# Patient Record
Sex: FEMALE | Race: BLACK OR AFRICAN AMERICAN | Employment: FULL TIME | URBAN - METROPOLITAN AREA
[De-identification: names, ages, dates, MRNs, and addresses within clinical notes are randomized per-mention and may not be internally consistent; named-entity substitution may affect disease eponyms.]

---

## 2022-10-29 ENCOUNTER — HOSPITAL ENCOUNTER (EMERGENCY)
Age: 37
Discharge: HOME | End: 2022-10-29
Attending: EMERGENCY MEDICINE

## 2022-10-29 ENCOUNTER — APPOINTMENT (OUTPATIENT)
Dept: ULTRASOUND IMAGING | Age: 37
End: 2022-10-29

## 2022-10-29 VITALS
HEIGHT: 63 IN | TEMPERATURE: 98.4 F | OXYGEN SATURATION: 99 % | WEIGHT: 214.6 LBS | SYSTOLIC BLOOD PRESSURE: 135 MMHG | DIASTOLIC BLOOD PRESSURE: 86 MMHG | BODY MASS INDEX: 38.02 KG/M2 | RESPIRATION RATE: 15 BRPM | HEART RATE: 88 BPM

## 2022-10-29 DIAGNOSIS — Z34.90 PREGNANCY, UNSPECIFIED GESTATIONAL AGE: Primary | ICD-10-CM

## 2022-10-29 DIAGNOSIS — R10.2 SUPRAPUBIC ABDOMINAL PAIN: ICD-10-CM

## 2022-10-29 DIAGNOSIS — R10.11 RIGHT UPPER QUADRANT ABDOMINAL PAIN: ICD-10-CM

## 2022-10-29 LAB
ALBUMIN SERPL-MCNC: 3.9 GM/DL (ref 3.4–5)
ALP BLD-CCNC: 56 IU/L (ref 40–129)
ALT SERPL-CCNC: 17 U/L (ref 10–40)
ANION GAP SERPL CALCULATED.3IONS-SCNC: 9 MMOL/L (ref 4–16)
AST SERPL-CCNC: 15 IU/L (ref 15–37)
BASOPHILS ABSOLUTE: 0 K/CU MM
BASOPHILS RELATIVE PERCENT: 0.4 % (ref 0–1)
BILIRUB SERPL-MCNC: 0.2 MG/DL (ref 0–1)
BILIRUBIN DIRECT: 0.2 MG/DL (ref 0–0.3)
BILIRUBIN URINE: NEGATIVE MG/DL
BILIRUBIN, INDIRECT: 0 MG/DL (ref 0–0.7)
BLOOD, URINE: NEGATIVE
BUN BLDV-MCNC: 11 MG/DL (ref 6–23)
CALCIUM SERPL-MCNC: 8.9 MG/DL (ref 8.3–10.6)
CHLORIDE BLD-SCNC: 104 MMOL/L (ref 99–110)
CLARITY: CLEAR
CO2: 22 MMOL/L (ref 21–32)
COLOR: YELLOW
CREAT SERPL-MCNC: 0.6 MG/DL (ref 0.6–1.1)
DIFFERENTIAL TYPE: ABNORMAL
EOSINOPHILS ABSOLUTE: 0.1 K/CU MM
EOSINOPHILS RELATIVE PERCENT: 1.3 % (ref 0–3)
GFR SERPL CREATININE-BSD FRML MDRD: >60 ML/MIN/1.73M2
GLUCOSE BLD-MCNC: 111 MG/DL (ref 70–99)
GLUCOSE, URINE: NEGATIVE MG/DL
GONADOTROPIN, CHORIONIC (HCG) QUANT: NORMAL UIU/ML
HCT VFR BLD CALC: 35.2 % (ref 37–47)
HEMOGLOBIN: 11.7 GM/DL (ref 12.5–16)
IMMATURE NEUTROPHIL %: 0.4 % (ref 0–0.43)
INTERPRETATION: ABNORMAL
KETONES, URINE: ABNORMAL MG/DL
LEUKOCYTE ESTERASE, URINE: NEGATIVE
LIPASE: 31 IU/L (ref 13–60)
LYMPHOCYTES ABSOLUTE: 2.4 K/CU MM
LYMPHOCYTES RELATIVE PERCENT: 31.4 % (ref 24–44)
MCH RBC QN AUTO: 29.3 PG (ref 27–31)
MCHC RBC AUTO-ENTMCNC: 33.2 % (ref 32–36)
MCV RBC AUTO: 88.2 FL (ref 78–100)
MONOCYTES ABSOLUTE: 0.7 K/CU MM
MONOCYTES RELATIVE PERCENT: 8.6 % (ref 0–4)
NITRITE URINE, QUANTITATIVE: NEGATIVE
PDW BLD-RTO: 12.2 % (ref 11.7–14.9)
PH, URINE: 7 (ref 5–8)
PLATELET # BLD: 396 K/CU MM (ref 140–440)
PMV BLD AUTO: 9 FL (ref 7.5–11.1)
POTASSIUM SERPL-SCNC: 4 MMOL/L (ref 3.5–5.1)
PREGNANCY, URINE: POSITIVE
PROTEIN UA: NEGATIVE MG/DL
RBC # BLD: 3.99 M/CU MM (ref 4.2–5.4)
SEGMENTED NEUTROPHILS ABSOLUTE COUNT: 4.5 K/CU MM
SEGMENTED NEUTROPHILS RELATIVE PERCENT: 57.9 % (ref 36–66)
SODIUM BLD-SCNC: 135 MMOL/L (ref 135–145)
SPECIFIC GRAVITY UA: 1.02 (ref 1–1.03)
SPECIFIC GRAVITY, URINE: 1.02 (ref 1–1.03)
TOTAL IMMATURE NEUTOROPHIL: 0.03 K/CU MM
TOTAL PROTEIN: 7.1 GM/DL (ref 6.4–8.2)
UROBILINOGEN, URINE: 0.2 MG/DL (ref 0.2–1)
WBC # BLD: 7.7 K/CU MM (ref 4–10.5)

## 2022-10-29 PROCEDURE — 99284 EMERGENCY DEPT VISIT MOD MDM: CPT

## 2022-10-29 PROCEDURE — 80053 COMPREHEN METABOLIC PANEL: CPT

## 2022-10-29 PROCEDURE — 6360000002 HC RX W HCPCS: Performed by: EMERGENCY MEDICINE

## 2022-10-29 PROCEDURE — 93975 VASCULAR STUDY: CPT

## 2022-10-29 PROCEDURE — 85025 COMPLETE CBC W/AUTO DIFF WBC: CPT

## 2022-10-29 PROCEDURE — 76817 TRANSVAGINAL US OBSTETRIC: CPT

## 2022-10-29 PROCEDURE — 81003 URINALYSIS AUTO W/O SCOPE: CPT

## 2022-10-29 PROCEDURE — 6370000000 HC RX 637 (ALT 250 FOR IP): Performed by: EMERGENCY MEDICINE

## 2022-10-29 PROCEDURE — 96374 THER/PROPH/DIAG INJ IV PUSH: CPT

## 2022-10-29 PROCEDURE — 83690 ASSAY OF LIPASE: CPT

## 2022-10-29 PROCEDURE — 82248 BILIRUBIN DIRECT: CPT

## 2022-10-29 PROCEDURE — 81025 URINE PREGNANCY TEST: CPT

## 2022-10-29 PROCEDURE — 84702 CHORIONIC GONADOTROPIN TEST: CPT

## 2022-10-29 RX ORDER — PNV NO.95/FERROUS FUM/FOLIC AC 28MG-0.8MG
1 TABLET ORAL DAILY
Qty: 30 TABLET | Refills: 0 | Status: SHIPPED | OUTPATIENT
Start: 2022-10-29

## 2022-10-29 RX ORDER — ACETAMINOPHEN 500 MG
1000 TABLET ORAL ONCE
Status: COMPLETED | OUTPATIENT
Start: 2022-10-29 | End: 2022-10-29

## 2022-10-29 RX ORDER — ONDANSETRON 2 MG/ML
4 INJECTION INTRAMUSCULAR; INTRAVENOUS EVERY 6 HOURS PRN
Status: DISCONTINUED | OUTPATIENT
Start: 2022-10-29 | End: 2022-10-30 | Stop reason: HOSPADM

## 2022-10-29 RX ADMIN — ONDANSETRON 4 MG: 2 INJECTION INTRAMUSCULAR; INTRAVENOUS at 18:25

## 2022-10-29 RX ADMIN — ACETAMINOPHEN 1000 MG: 500 TABLET ORAL at 18:26

## 2022-10-29 ASSESSMENT — PAIN - FUNCTIONAL ASSESSMENT
PAIN_FUNCTIONAL_ASSESSMENT: 0-10
PAIN_FUNCTIONAL_ASSESSMENT: NONE - DENIES PAIN

## 2022-10-29 ASSESSMENT — PAIN DESCRIPTION - LOCATION: LOCATION: ABDOMEN

## 2022-10-29 ASSESSMENT — PAIN DESCRIPTION - FREQUENCY: FREQUENCY: INTERMITTENT

## 2022-10-29 ASSESSMENT — PAIN DESCRIPTION - PAIN TYPE: TYPE: ACUTE PAIN

## 2022-10-29 ASSESSMENT — PAIN SCALES - GENERAL: PAINLEVEL_OUTOF10: 5

## 2022-10-29 ASSESSMENT — PAIN DESCRIPTION - DESCRIPTORS: DESCRIPTORS: ACHING

## 2022-10-29 NOTE — ED PROVIDER NOTES
Emergency Department Encounter    Patient: Rama Álvarez  MRN: [id number]  : 1985  Date of Evaluation: 2022  ED Provider:  Michelle Casas MD    Triage Chief Complaint:   Abdominal Pain    [place]:  Rama Álvarez is a 40 y.o. female that presents complaining of intermittent 5 out of 10 crampy abdominal discomfort located in the suprapubic region with associated nausea. No vomiting. No urinary frequency, urgency, dysuria, hematuria. No diarrhea, constipation, hematochezia or melena. Patient denies any fever or cough. Patient denies chest pain. She does report some occasional \"heavy breathing\" with activity which is been going on for the past 6 days. Abdominal discomfort has been present for the past 5 to 6 days. No lower extremity edema. Patient currently denies any abdominal pain. Patient is unsure if she may be pregnant. Patient denies any abnormal vaginal discharge or bleeding. No known alleviating or exacerbating factors. No radiation. No flank pain. ROS - see HPI, below listed is current ROS at time of my eval:  General:  No fevers, no chills, no weakness  Eyes:  no discharge  ENT:  No sore throat, no nasal congestion  Cardiovascular:  No chest pain, no palpitations  Respiratory:  no cough, no wheezing  Gastrointestinal:  + Suprapubic abdominal pain, + nausea, no vomiting, no diarrhea  Musculoskeletal:  No muscle pain, no joint pain  Skin:  No rash, no pruritis  Neurologic:  no headache  Genitourinary:  No dysuria, no hematuria  Endocrine:  No unexpected weight gain, no unexpected weight loss  Extremities:  no edema, no pain    History reviewed. No pertinent past medical history. History reviewed. No pertinent surgical history. History reviewed. No pertinent family history.   Social History     Socioeconomic History    Marital status: Single     Spouse name: Not on file    Number of children: Not on file    Years of education: Not on file    Highest education level: Not on file Occupational History    Not on file   Tobacco Use    Smoking status: Never    Smokeless tobacco: Never   Vaping Use    Vaping Use: Never used   Substance and Sexual Activity    Alcohol use: Never    Drug use: Never    Sexual activity: Not on file   Other Topics Concern    Not on file   Social History Narrative    Not on file     Social Determinants of Health     Financial Resource Strain: Not on file   Food Insecurity: Not on file   Transportation Needs: Not on file   Physical Activity: Not on file   Stress: Not on file   Social Connections: Not on file   Intimate Partner Violence: Not on file   Housing Stability: Not on file     No current facility-administered medications for this encounter. Current Outpatient Medications   Medication Sig Dispense Refill    Prenatal Vit-Fe Fumarate-FA (PRENATAL VITAMIN) 27-0.8 MG TABS Take 1 tablet by mouth daily 30 tablet 0     Allergies   Allergen Reactions    Penicillins        Nursing Notes Reviewed    Physical Exam:  Triage VS:    ED Triage Vitals [10/29/22 1742]   Enc Vitals Group      /86      Heart Rate 88      Resp 15      Temp 98.4 °F (36.9 °C)      Temp Source Oral      SpO2 99 %      Weight 214 lb 9.6 oz (97.3 kg)      Height 5' 2.6\" (1.59 m)      Head Circumference       Peak Flow       Pain Score       Pain Loc       Pain Edu? Excl. in [address]? My pulse ox interpretation is - normal    General appearance:  No acute distress. Skin:  Warm. Dry. No rash. Neck:  Trachea midline. Heart:  Regular rate and rhythm, normal S1 & S2.    Perfusion:  intact  Respiratory:  Lungs clear to auscultation bilaterally. Respirations nonlabored.      Abdominal:  + suprapubic and to a lesser degree right lower quadrant abdominal tenderness to palpation, no rebound guarding or rigidity, neg Meng's sign, neg McBurney's point tenderness to palpation, normal bowel sounds, no masses, no organomegaly, no pulsatile masses  Back:  No CVA TTP  Extremity:    normal ROM Neurological:  Alert and oriented times 3.       I have reviewed and interpreted all of the currently available lab results from this visit (if applicable):  Results for orders placed or performed during the hospital encounter of 10/29/22   CBC with Auto Differential   Result Value Ref Range    WBC 7.7 4.0 - 10.5 K/CU MM    RBC 3.99 (L) 4.2 - 5.4 M/CU MM    Hemoglobin 11.7 (L) 12.5 - 16.0 GM/DL    Hematocrit 35.2 (L) 37 - 47 %    MCV 88.2 78 - 100 FL    MCH 29.3 27 - 31 PG    MCHC 33.2 32.0 - 36.0 %    RDW 12.2 11.7 - 14.9 %    Platelets 351 132 - 537 K/CU MM    MPV 9.0 7.5 - 11.1 FL    Differential Type AUTOMATED DIFFERENTIAL     Segs Relative 57.9 36 - 66 %    Lymphocytes % 31.4 24 - 44 %    Monocytes % 8.6 (H) 0 - 4 %    Eosinophils % 1.3 0 - 3 %    Basophils % 0.4 0 - 1 %    Segs Absolute 4.5 K/CU MM    Lymphocytes Absolute 2.4 K/CU MM    Monocytes Absolute 0.7 K/CU MM    Eosinophils Absolute 0.1 K/CU MM    Basophils Absolute 0.0 K/CU MM    Immature Neutrophil % 0.4 0 - 0.43 %    Total Immature Neutrophil 0.03 K/CU MM   Basic Metabolic Panel   Result Value Ref Range    Sodium 135 135 - 145 MMOL/L    Potassium 4.0 3.5 - 5.1 MMOL/L    Chloride 104 99 - 110 mMol/L    CO2 22 21 - 32 MMOL/L    Anion Gap 9 4 - 16    BUN 11 6 - 23 MG/DL    Creatinine 0.6 0.6 - 1.1 MG/DL    Est, Glom Filt Rate >60 >60 mL/min/1.73m2    Glucose 111 (H) 70 - 99 MG/DL    Calcium 8.9 8.3 - 10.6 MG/DL   Hepatic Function Panel   Result Value Ref Range    Albumin 3.9 3.4 - 5.0 GM/DL    Total Bilirubin 0.2 0.0 - 1.0 MG/DL    Bilirubin, Direct 0.2 0.0 - 0.3 MG/DL    Bilirubin, Indirect 0.0 0 - 0.7 MG/DL    Alkaline Phosphatase 56 40 - 129 IU/L    AST 15 15 - 37 IU/L    ALT 17 10 - 40 U/L    Total Protein 7.1 6.4 - 8.2 GM/DL   Lipase   Result Value Ref Range    Lipase 31 13 - 60 IU/L   Urinalysis   Result Value Ref Range    Color, UA YELLOW YELLOW    Clarity, UA CLEAR CLEAR    Glucose, Urine NEGATIVE NEGATIVE MG/DL    Bilirubin Urine NEGATIVE NEGATIVE MG/DL    Ketones, Urine TRACE (A) NEGATIVE MG/DL    Specific Gravity, UA 1.020 1.001 - 1.035    Blood, Urine NEGATIVE NEGATIVE    pH, Urine 7.0 5.0 - 8.0    Protein, UA NEGATIVE NEGATIVE MG/DL    Urobilinogen, Urine 0.2 0.2 - 1.0 MG/DL    Nitrite Urine, Quantitative NEGATIVE NEGATIVE    Leukocyte Esterase, Urine NEGATIVE NEGATIVE   HCG, Urine, Qualitative, Pregnancy (Lab)   Result Value Ref Range    Pregnancy, Urine POSITIVE (A) NEGATIVE    Specific Gravity, Urine 1.020 1.001 - 1.035    Interpretation HCG METHOD LIMITATIONS:    HCG, Quantitative, Pregnancy   Result Value Ref Range    hCG Quant 35445.0 uIU/ML      Radiographs (if obtained):  Radiologist's Report Reviewed:  No results found. EKG (if obtained): (All EKG's are interpreted by myself in the absence of a cardiologist)    MDM:  Patient presents complaining of abdominal discomfort in the suprapubic region. Patient denied any vaginal bleeding, leakage of fluid from the vagina. Pregnancy screen is positive. Hemoglobin 11.7. No leukocytosis. No evidence of acute hepatic, biliary, pancreatic disease by labs. UA without evidence of urinary tract infection. Quantitative Beta-hCG pending. Ultrasound of the pelvis is also ordered and pending. Patient will be signed out to oncoming provider at shift change for follow-up of pending studies and appropriate disposition. Clinical Impression:  1. Pregnancy, unspecified gestational age    3. Suprapubic abdominal pain    3.  Right upper quadrant abdominal pain      Disposition referral (if applicable):  Jacquelin Clements MD  [facility] [phone]    Call in 2 days      [place] [address] [address]  [phone]  Go to   As needed, If symptoms worsen  Disposition medications (if applicable):  Discharge Medication List as of 10/29/2022  9:52 PM        START taking these medications    Details   Prenatal Vit-Fe Fumarate-FA (PRENATAL VITAMIN) 27-0.8 MG TABS Take 1 tablet by mouth daily, Disp-30 tablet, R-0Normal           ED Provider Disposition Time  DISPOSITION Decision To Discharge 10/29/2022 07:21:50 PM      Comment: Please note this report has been produced using speech recognition software and may contain errors related to that system including errors in grammar, punctuation, and spelling, as well as words and phrases that may be inappropriate. Efforts were made to edit the dictations.         Carissa Ortiz MD  11/01/22 1945

## 2022-10-29 NOTE — ED TRIAGE NOTES
Pt arrives with complaints of lower abd pain for about 5-6 days, denies any fever. No urinary complaints, no diarrhea or constipation, nauseated but has not vomited, LMP was in August, unsure if pregnant,   Pt does not speak [place], the patients boyfriend is interpreting for this information but awaiting for [facility]  via IPAD.

## 2022-10-29 NOTE — DISCHARGE INSTRUCTIONS
Return immediately to the emergency department if you experience new or worsened symptoms, worsening abdominal pain, consistent frequent abdominal pain or cramping, abnormal vaginal discharge or bleeding, leakage of fluid, or for any other concerns.

## 2022-10-29 NOTE — ED PROVIDER NOTES
7:30 PM  Care of patient transferred from Dr. Batsheva Milligan to me. Patient is a 70-year-old female who presents with cramping, suprapubic abdominal pain. Patient was found to be subsequently pregnant. Ultrasound, and beta quantitative is pending. He was not able to ask patient when the first date of her last menstrual period was, or if she has a history of pregnancy. Labs reviewed:  Patient without leukocytosis, hemoglobin and platelets within acceptable limits. Electrolytes, BUN and creatinine, ALT, AST within acceptable limits. UA without signs of infection, nitrate and leukocyte esterase negative. Pregnancy on urinalysis is positive. Beta hCG quantitative is 63,941. Imaging reviewed:  Ultrasound shows single live intrauterine gestation of 6 weeks 5 days estimated sonographic gestational age. No.  Gestational hemorrhage. Normal ovaries with normal Doppler flow. 9:40 PM  Recheck of patient. She is resting comfortably in bed and states that her abdominal pain has improved. Of note patient is [place] speaking, [employer]  is used throughout my reexamination, discussion of further HPI, and discussion of all labs and imaging, as well as discharge planning. Patient has had 1 previous pregnancy approximately 11 years ago, she is . She did carry this pregnancy to term. Patient's first day of last menstrual period was  or . She denies any vaginal discharge or vaginal bleeding. All labs and imaging discussed with patient. All questions are answered. Repeat abdominal exam, abdomen soft, nontender, nondistended without peritoneal signs. Patient is afebrile, not tachycardic, not tachypneic, 99% on room air with blood pressure within acceptable limits. Patient is appropriate for outpatient follow-up at this time. Patient to follow-up with OB/GYN in 1 to 2 days.   She is to return to the emergency department symptoms worsen, return precautions are discussed and she does verbalize understanding of this. All questions are answered and she is agreeable with plan of care.      [id number] [facility],   10/29/22 4341

## 2022-12-15 ENCOUNTER — HOSPITAL ENCOUNTER (EMERGENCY)
Age: 37
Discharge: HOME | End: 2022-12-15
Attending: EMERGENCY MEDICINE

## 2022-12-15 VITALS
TEMPERATURE: 98.1 F | DIASTOLIC BLOOD PRESSURE: 61 MMHG | SYSTOLIC BLOOD PRESSURE: 122 MMHG | OXYGEN SATURATION: 98 % | HEART RATE: 79 BPM | RESPIRATION RATE: 16 BRPM

## 2022-12-15 DIAGNOSIS — R10.9 ABDOMINAL PAIN IN PREGNANCY, SECOND TRIMESTER: Primary | ICD-10-CM

## 2022-12-15 DIAGNOSIS — O26.892 ABDOMINAL PAIN IN PREGNANCY, SECOND TRIMESTER: Primary | ICD-10-CM

## 2022-12-15 LAB
ABO/RH: NORMAL
ALBUMIN SERPL-MCNC: 4.3 GM/DL (ref 3.4–5)
ALP BLD-CCNC: 52 IU/L (ref 40–129)
ALT SERPL-CCNC: 16 U/L (ref 10–40)
ANION GAP SERPL CALCULATED.3IONS-SCNC: 12 MMOL/L (ref 4–16)
AST SERPL-CCNC: 21 IU/L (ref 15–37)
BASOPHILS ABSOLUTE: 0 K/CU MM
BASOPHILS RELATIVE PERCENT: 0.3 % (ref 0–1)
BILIRUB SERPL-MCNC: 0.2 MG/DL (ref 0–1)
BILIRUBIN URINE: NEGATIVE MG/DL
BLOOD, URINE: NEGATIVE
BUN BLDV-MCNC: 6 MG/DL (ref 6–23)
CALCIUM SERPL-MCNC: 9.3 MG/DL (ref 8.3–10.6)
CHLORIDE BLD-SCNC: 102 MMOL/L (ref 99–110)
CLARITY: CLEAR
CO2: 23 MMOL/L (ref 21–32)
COLOR: YELLOW
COMMENT UA: NORMAL
CREAT SERPL-MCNC: 0.5 MG/DL (ref 0.6–1.1)
DIFFERENTIAL TYPE: ABNORMAL
EOSINOPHILS ABSOLUTE: 0.1 K/CU MM
EOSINOPHILS RELATIVE PERCENT: 1.8 % (ref 0–3)
GFR SERPL CREATININE-BSD FRML MDRD: >60 ML/MIN/1.73M2
GLUCOSE BLD-MCNC: 95 MG/DL (ref 70–99)
GLUCOSE, URINE: NEGATIVE MG/DL
GONADOTROPIN, CHORIONIC (HCG) QUANT: NORMAL UIU/ML
HCT VFR BLD CALC: 34.9 % (ref 37–47)
HEMOGLOBIN: 11.6 GM/DL (ref 12.5–16)
IMMATURE NEUTROPHIL %: 0.3 % (ref 0–0.43)
KETONES, URINE: NEGATIVE MG/DL
LEUKOCYTE ESTERASE, URINE: NEGATIVE
LIPASE: 31 IU/L (ref 13–60)
LYMPHOCYTES ABSOLUTE: 2.2 K/CU MM
LYMPHOCYTES RELATIVE PERCENT: 30 % (ref 24–44)
Lab: NORMAL
MCH RBC QN AUTO: 29.7 PG (ref 27–31)
MCHC RBC AUTO-ENTMCNC: 33.2 % (ref 32–36)
MCV RBC AUTO: 89.5 FL (ref 78–100)
MONOCYTES ABSOLUTE: 0.6 K/CU MM
MONOCYTES RELATIVE PERCENT: 7.7 % (ref 0–4)
NITRITE URINE, QUANTITATIVE: NEGATIVE
PDW BLD-RTO: 12.4 % (ref 11.7–14.9)
PH, URINE: 6.5 (ref 5–8)
PLATELET # BLD: 424 K/CU MM (ref 140–440)
PMV BLD AUTO: 9.8 FL (ref 7.5–11.1)
POTASSIUM SERPL-SCNC: 3.6 MMOL/L (ref 3.5–5.1)
PROTEIN UA: NEGATIVE MG/DL
RBC # BLD: 3.9 M/CU MM (ref 4.2–5.4)
REASON FOR REJECTION: NORMAL
REJECTED TEST: NORMAL
SEGMENTED NEUTROPHILS ABSOLUTE COUNT: 4.3 K/CU MM
SEGMENTED NEUTROPHILS RELATIVE PERCENT: 59.9 % (ref 36–66)
SODIUM BLD-SCNC: 137 MMOL/L (ref 135–145)
SPECIFIC GRAVITY UA: <1.005 (ref 1–1.03)
SPECIMEN: NORMAL
TOTAL IMMATURE NEUTOROPHIL: 0.02 K/CU MM
TOTAL PROTEIN: 7.6 GM/DL (ref 6.4–8.2)
UROBILINOGEN, URINE: 0.2 MG/DL (ref 0.2–1)
WBC # BLD: 7.2 K/CU MM (ref 4–10.5)
WET PREP: NORMAL

## 2022-12-15 PROCEDURE — 83690 ASSAY OF LIPASE: CPT

## 2022-12-15 PROCEDURE — 85025 COMPLETE CBC W/AUTO DIFF WBC: CPT

## 2022-12-15 PROCEDURE — 87491 CHLMYD TRACH DNA AMP PROBE: CPT

## 2022-12-15 PROCEDURE — 86900 BLOOD TYPING SEROLOGIC ABO: CPT

## 2022-12-15 PROCEDURE — 84702 CHORIONIC GONADOTROPIN TEST: CPT

## 2022-12-15 PROCEDURE — 87591 N.GONORRHOEAE DNA AMP PROB: CPT

## 2022-12-15 PROCEDURE — 80053 COMPREHEN METABOLIC PANEL: CPT

## 2022-12-15 PROCEDURE — 86901 BLOOD TYPING SEROLOGIC RH(D): CPT

## 2022-12-15 PROCEDURE — 81003 URINALYSIS AUTO W/O SCOPE: CPT

## 2022-12-15 PROCEDURE — 99283 EMERGENCY DEPT VISIT LOW MDM: CPT

## 2022-12-15 PROCEDURE — 87210 SMEAR WET MOUNT SALINE/INK: CPT

## 2022-12-15 RX ORDER — ACETAMINOPHEN 500 MG
500 TABLET ORAL 4 TIMES DAILY PRN
Qty: 120 TABLET | Refills: 0 | Status: SHIPPED | OUTPATIENT
Start: 2022-12-15

## 2022-12-15 ASSESSMENT — PAIN DESCRIPTION - LOCATION: LOCATION: ABDOMEN

## 2022-12-15 ASSESSMENT — PAIN DESCRIPTION - PAIN TYPE: TYPE: ACUTE PAIN

## 2022-12-15 ASSESSMENT — PAIN DESCRIPTION - ORIENTATION: ORIENTATION: RIGHT;LEFT;LOWER

## 2022-12-15 ASSESSMENT — PAIN SCALES - GENERAL: PAINLEVEL_OUTOF10: 2

## 2022-12-15 ASSESSMENT — PAIN - FUNCTIONAL ASSESSMENT
PAIN_FUNCTIONAL_ASSESSMENT: ACTIVITIES ARE NOT PREVENTED
PAIN_FUNCTIONAL_ASSESSMENT: 0-10

## 2022-12-15 ASSESSMENT — PAIN DESCRIPTION - DESCRIPTORS: DESCRIPTORS: PRESSURE

## 2022-12-15 NOTE — ED NOTES
Dr. Valentine Orlonnie to bedside for initial evaluation     Nasir Zamarripa RN  12/15/22 Norm Rubio

## 2022-12-16 NOTE — DISCHARGE INSTRUCTIONS
Follow-up with OB/GYN in 4 days as scheduled for evaluation of abdominal pain in pregnancy and for prenatal care. Continue prenatal vitamins as prescribed and directed  Take Tylenol every 4 hours as needed for pain  Return to the emergency department immediately severe abdominal pain any vaginal bleeding dizzy lightheadedness chest pain shortness of breath or worsening symptoms.

## 2022-12-16 NOTE — ED PROVIDER NOTES
Emergency Department Encounter    Patient: Linette Franklin  MRN: [id number]  : 1985  Date of Evaluation: 12/15/2022  ED Provider:  Shannan Sutherland DO    Triage Chief Complaint:   Abdominal Pain (Pressure to lower abdomen, pt is 16 weeks pregnant. Pt concerned about pain and would like her baby checked. Pt has not had any prenatal care and is not set up with an OB yet. No current distress noted. Denies any other complaints. AOx4, breathing unlabored, skin warm and dry)    [place]:  Linette Franklin is a 40 y.o. female that presents to the emergency department complaints of lower abdominal pain and pressure. Patient [place] speaking and  was used during this visit. Patient  at 16 weeks gestation. Patient states she has been having some pain in the lower abdomen since Monday. Patient had no falls injuries trauma no heavy lifting pulling or straining. Patient states she notices little bit of blood spotting yesterday. Patient states it stopped. States today she still having pain in lower abdomen so she came in for evaluation. Patient states no vaginal discharge itching odors or concern for STD. She states she has an appointment with OB/GYN on Monday in 4 days. Patient that she has not been taking any Tylenol for pain. She states she has been taking prenatal vitamins. Patient states abdominal pain 6 out of 10 on the pain scale. Patient denies any dysuria or hematuria. Patient denies any chest pain shortness of fever chills cough sore throat runny nose earache. Patient here with  for evaluation.     ROS - see HPI, below listed is current ROS at time of my eval:  General:  No fevers, no chills, no weakness  Eyes:  No recent vison changes, no discharge  ENT:  No sore throat, no nasal congestion, no hearing changes  Cardiovascular:  No chest pain, no palpitations  Respiratory:  No shortness of breath, no cough, no wheezing  Gastrointestinal: Positive for lower abdominal pain-pressure, no nausea, no vomiting, no diarrhea  Musculoskeletal:  No muscle pain, no joint pain  Skin:  No rash, no pruritis, no easy bruising  Neurologic:  No speech problems, no headache, no extremity numbness, no extremity tingling, no extremity weakness  Psychiatric:  No anxiety  Genitourinary:  No dysuria, no hematuria  Endocrine:  No unexpected weight gain, no unexpected weight loss  Extremities:  no edema, no pain    History reviewed. No pertinent past medical history. History reviewed. No pertinent surgical history. History reviewed. No pertinent family history. Social History     Socioeconomic History    Marital status: Single     Spouse name: Not on file    Number of children: Not on file    Years of education: Not on file    Highest education level: Not on file   Occupational History    Not on file   Tobacco Use    Smoking status: Never    Smokeless tobacco: Never   Vaping Use    Vaping Use: Never used   Substance and Sexual Activity    Alcohol use: Never    Drug use: Never    Sexual activity: Not on file   Other Topics Concern    Not on file   Social History Narrative    Not on file     Social Determinants of Health     Financial Resource Strain: Not on file   Food Insecurity: Not on file   Transportation Needs: Not on file   Physical Activity: Not on file   Stress: Not on file   Social Connections: Not on file   Intimate Partner Violence: Not on file   Housing Stability: Not on file     No current facility-administered medications for this encounter.      Current Outpatient Medications   Medication Sig Dispense Refill    Prenatal Vit-Fe Fumarate-FA (PRENATAL VITAMIN) 27-0.8 MG TABS Take 1 tablet by mouth daily 30 tablet 0     Allergies   Allergen Reactions    Penicillins        Nursing Notes Reviewed    Physical Exam:  Triage VS:    ED Triage Vitals [12/15/22 1552]   Enc Vitals Group      /61      Heart Rate 79      Resp 16      Temp 98.1 °F (36.7 °C)      Temp Source Oral      SpO2 98 % Weight       Height       Head Circumference       Peak Flow       Pain Score       Pain Loc       Pain Edu? Excl. in [address]? My pulse ox interpretation is - normal    General appearance:  No acute distress. Skin:  Warm. Dry. Eye:  Extraocular movements intact. Ears, nose, mouth and throat:  Oral mucosa moist   Neck:  Trachea midline. Extremity:  No swelling. Normal ROM     Heart:  Regular rate and rhythm, normal S1 & S2, no extra heart sounds. Perfusion:  intact  Respiratory:  Lungs clear to auscultation bilaterally. Respirations nonlabored. Abdominal:  Normal bowel sounds. Soft. Nontender. Non distended. Pelvic exam: No external genitalia lesions, no cervical motion tenderness, white discharge in the vaginal vault, cervix closed, no bleeding, no palpable adnexal masses noted, some discomfort during pelvic exam, cultures obtained  Back:  No CVA tenderness to palpation     Neurological:  Alert and oriented times 3. No focal neuro deficits.              Psychiatric:  Appropriate    I have reviewed and interpreted all of the currently available lab results from this visit (if applicable):  Results for orders placed or performed during the hospital encounter of 12/15/22   Wet prep, genital    Specimen: Vaginal   Result Value Ref Range    Specimen VAGINA     Special Requests NONE     Wet Prep       NO TRICHOMONAS OR YEAST NOTED ON DIRECT WET PREP  NEGATIVE  CLUE CELLS NOTED     CBC with Diff   Result Value Ref Range    WBC 7.2 4.0 - 10.5 K/CU MM    RBC 3.90 (L) 4.2 - 5.4 M/CU MM    Hemoglobin 11.6 (L) 12.5 - 16.0 GM/DL    Hematocrit 34.9 (L) 37 - 47 %    MCV 89.5 78 - 100 FL    MCH 29.7 27 - 31 PG    MCHC 33.2 32.0 - 36.0 %    RDW 12.4 11.7 - 14.9 %    Platelets 461 905 - 071 K/CU MM    MPV 9.8 7.5 - 11.1 FL    Differential Type AUTOMATED DIFFERENTIAL     Segs Relative 59.9 36 - 66 %    Lymphocytes % 30.0 24 - 44 %    Monocytes % 7.7 (H) 0 - 4 %    Eosinophils % 1.8 0 - 3 %    Basophils % 0.3 0 - 1 %    Segs Absolute 4.3 K/CU MM    Lymphocytes Absolute 2.2 K/CU MM    Monocytes Absolute 0.6 K/CU MM    Eosinophils Absolute 0.1 K/CU MM    Basophils Absolute 0.0 K/CU MM    Immature Neutrophil % 0.3 0 - 0.43 %    Total Immature Neutrophil 0.02 K/CU MM   Urinalysis with Reflex to Culture    Specimen: Urine   Result Value Ref Range    Color, UA YELLOW YELLOW    Clarity, UA CLEAR CLEAR    Glucose, Urine NEGATIVE NEGATIVE MG/DL    Bilirubin Urine NEGATIVE NEGATIVE MG/DL    Ketones, Urine NEGATIVE NEGATIVE MG/DL    Specific Gravity, UA <1.005 1.001 - 1.035    Blood, Urine NEGATIVE NEGATIVE    pH, Urine 6.5 5.0 - 8.0    Protein, UA NEGATIVE NEGATIVE MG/DL    Urobilinogen, Urine 0.2 0.2 - 1.0 MG/DL    Nitrite Urine, Quantitative NEGATIVE NEGATIVE    Leukocyte Esterase, Urine NEGATIVE NEGATIVE    Urinalysis Comments       Microscopic exam not performed based on chemical results unless requested in original order. HCG Serum, Quantitative   Result Value Ref Range    hCG Quant 33695.0 uIU/ML   SPECIMEN REJECTION   Result Value Ref Range    Rejected Test CMPR, LIPA     Reason for Rejection UNABLE TO PERFORM TESTING:    Comprehensive Metabolic Panel   Result Value Ref Range    Sodium 137 135 - 145 MMOL/L    Potassium 3.6 3.5 - 5.1 MMOL/L    Chloride 102 99 - 110 mMol/L    CO2 23 21 - 32 MMOL/L    BUN 6 6 - 23 MG/DL    Creatinine 0.5 (L) 0.6 - 1.1 MG/DL    Est, Glom Filt Rate >60 >60 mL/min/1.73m2    Glucose 95 70 - 99 MG/DL    Calcium 9.3 8.3 - 10.6 MG/DL    Albumin 4.3 3.4 - 5.0 GM/DL    Total Protein 7.6 6.4 - 8.2 GM/DL    Total Bilirubin 0.2 0.0 - 1.0 MG/DL    ALT 16 10 - 40 U/L    AST 21 15 - 37 IU/L    Alkaline Phosphatase 52 40 - 129 IU/L    Anion Gap 12 4 - 16   Lipase   Result Value Ref Range    Lipase 31 13 - 60 IU/L      Radiographs (if obtained):  Radiologist's Report Reviewed:  No results found.     EKG (if obtained): (All EKG's are interpreted by myself in the absence of a cardiologist)      MDM:  Patient presents to the emergency department G3, P1 at 16 weeks gestation. Patient placed lower abdominal pressure and some spotting yesterday but spotting has resolved today but still having some lower abdominal pain and pressure. Patient has not taken any Tylenol. Did discuss patient can take Tylenol 500 mg p.o. every 4 hours as needed for pain. Patient states she does have an appoint with her OB/GYN on Monday to establish prenatal care. Patient states her blood type is O+. Patient states no falls injuries trauma no heavy lifting pulling or straining. Patient has been seen in the emergency department and has established intrauterine pregnancy when she was 6 weeks gestation. Patient was ordered laboratory studies which showed normal sodium potassium kidney function glucose calcium normal liver enzymes lipase, normal white blood cell count platelets. Patient hemoglobin was 11.6. Quantitative hCG 34,496. I did order a type and screen which would not be back and has to be shipped to the outlying facility. Patient does not want to wait for those results. Patient  states he has agreed to work. Urinalysis negative for urinary tract infection. Did perform pelvic exam some discharge in the vaginal vault with cervix closed no bleeding no cervical motion tenderness cultures obtained and pending. Wet prep did come back with negative trichomonas, clue cells noted. GC and Chlamydia pending at this time. Patient updated on laboratory study findings. I discussed with patient follow-up with OB/GYN in 4 days as scheduled. She is to take Tylenol as needed for pain. She is to continue her prenatal vitamins. She is to return to the emergency department if any vaginal bleeding worsening pain or worsening symptoms. All questions answered. Clinical Impression:  1. Abdominal pain in pregnancy, second trimester      Disposition referral (if applicable):   Your OB/GYN    Go in 4 days  as scheduled to establish prenatal care. Keep Scheduled appointment    [facility]  [address]  [phone]  Schedule an appointment as soon as possible for a visit in 1 day  to establish a primary care physician. call for an appointment    [facility]  [address] [facility]  [address] [place] [address]  [phone]  Go in 1 day  If symptoms worsen    Disposition medications (if applicable):  Discharge Medication List as of 12/15/2022  7:09 PM        ED Provider Disposition Time  DISPOSITION Decision To Discharge 12/15/2022 07:04:07 PM      Comment: Please note this report has been produced using speech recognition software and may contain errors related to that system including errors in grammar, punctuation, and spelling, as well as words and phrases that may be inappropriate. Efforts were made to edit the dictations.         Denise Roblero,   12/15/22 1923

## 2022-12-16 NOTE — ED NOTES
Discharge instructions and prescription given, pt and  voiced understanding. Ambulatory to exit without incident.       Genaro López RN  12/15/22 1804

## 2022-12-18 LAB
CHLAMYDIA TRACHOMATIS AMPLIFIED DET: NEGATIVE
N GONORRHOEAE AMPLIFIED DET: NEGATIVE

## 2023-01-13 ENCOUNTER — HOSPITAL ENCOUNTER (OUTPATIENT)
Age: 38
Discharge: HOME | End: 2023-01-13
Attending: OBSTETRICS & GYNECOLOGY | Admitting: OBSTETRICS & GYNECOLOGY
Payer: COMMERCIAL

## 2023-01-13 PROBLEM — R10.9 CRAMPING AFFECTING PREGNANCY, ANTEPARTUM: Status: ACTIVE | Noted: 2023-01-13

## 2023-01-13 PROBLEM — O26.899 CRAMPING AFFECTING PREGNANCY, ANTEPARTUM: Status: ACTIVE | Noted: 2023-01-13

## 2023-01-13 LAB
AMPHETAMINES: NEGATIVE
BARBITURATE SCREEN URINE: NEGATIVE
BENZODIAZEPINE SCREEN, URINE: NEGATIVE
BILIRUBIN URINE: NEGATIVE MG/DL
BLOOD, URINE: NEGATIVE
CANNABINOID SCREEN URINE: NEGATIVE
CLARITY: CLEAR
COCAINE METABOLITE: NEGATIVE
COLOR: YELLOW
COMMENT UA: ABNORMAL
GLUCOSE, URINE: 250 MG/DL
KETONES, URINE: ABNORMAL MG/DL
LEUKOCYTE ESTERASE, URINE: NEGATIVE
NITRITE URINE, QUANTITATIVE: NEGATIVE
OPIATES, URINE: NEGATIVE
OXYCODONE: NEGATIVE
PH, URINE: 7.5 (ref 5–8)
PHENCYCLIDINE, URINE: NEGATIVE
PROTEIN UA: NEGATIVE MG/DL
SPECIFIC GRAVITY UA: 1.02 (ref 1–1.03)
UROBILINOGEN, URINE: 0.2 MG/DL (ref 0.2–1)

## 2023-01-13 PROCEDURE — 80307 DRUG TEST PRSMV CHEM ANLYZR: CPT

## 2023-01-13 PROCEDURE — 99203 OFFICE O/P NEW LOW 30 MIN: CPT

## 2023-01-13 PROCEDURE — 81003 URINALYSIS AUTO W/O SCOPE: CPT

## 2023-01-13 RX ORDER — ACETAMINOPHEN 500 MG
1000 TABLET ORAL EVERY 4 HOURS PRN
Status: DISCONTINUED | OUTPATIENT
Start: 2023-01-13 | End: 2023-01-14 | Stop reason: HOSPADM

## 2023-01-14 VITALS
BODY MASS INDEX: 35.85 KG/M2 | HEART RATE: 83 BPM | OXYGEN SATURATION: 99 % | SYSTOLIC BLOOD PRESSURE: 124 MMHG | RESPIRATION RATE: 16 BRPM | HEIGHT: 64 IN | WEIGHT: 210 LBS | TEMPERATURE: 97.8 F | DIASTOLIC BLOOD PRESSURE: 84 MMHG

## 2023-01-14 NOTE — FLOWSHEET NOTE
Verbal update to [address] informed of EDC, G-3, P-1, pt presents with concerns for lower abdominal cramping, no ctx noted reassuring fetal heart rate, ua results reviewed, pt has not obtained prenatal care at this time. Order received to have pt call [facility] on Monday for appointment.

## 2023-01-14 NOTE — FLOWSHEET NOTE
Admission assessment completed as charted LL [id number] pt states has had discomfort for about one month had appointment today at [facility] but needed to be there fifteen minutes prior and wasn't so had to reschedule appointment,  pt has not had any PNC POC discussed and OB history taken and updated, FOB at bedside. Juice and luzmaria crackers given.

## 2023-01-14 NOTE — FLOWSHEET NOTE
Presents to L/D ambulatory with c/o lower abdominal pain  pt speaks [place]   Creole Mary  LL notified  for interpretation and assessment shown to LT 02.

## 2023-07-07 ENCOUNTER — HOSPITAL ENCOUNTER (OUTPATIENT)
Age: 38
Discharge: HOME | End: 2023-07-07
Attending: EMERGENCY MEDICINE | Admitting: OBSTETRICS & GYNECOLOGY
Payer: COMMERCIAL

## 2023-07-07 ENCOUNTER — APPOINTMENT (OUTPATIENT)
Dept: ULTRASOUND IMAGING | Age: 38
End: 2023-07-07
Payer: COMMERCIAL

## 2023-07-07 VITALS
WEIGHT: 203 LBS | OXYGEN SATURATION: 99 % | BODY MASS INDEX: 34.84 KG/M2 | DIASTOLIC BLOOD PRESSURE: 79 MMHG | TEMPERATURE: 97.8 F | RESPIRATION RATE: 16 BRPM | HEART RATE: 85 BPM | SYSTOLIC BLOOD PRESSURE: 134 MMHG

## 2023-07-07 DIAGNOSIS — R10.30 LOWER ABDOMINAL PAIN: Primary | ICD-10-CM

## 2023-07-07 DIAGNOSIS — Z32.01 POSITIVE PREGNANCY TEST: ICD-10-CM

## 2023-07-07 PROBLEM — O26.899 ABDOMINAL PAIN AFFECTING PREGNANCY: Status: ACTIVE | Noted: 2023-07-07

## 2023-07-07 PROBLEM — R10.9 ABDOMINAL PAIN AFFECTING PREGNANCY: Status: ACTIVE | Noted: 2023-07-07

## 2023-07-07 LAB
BACTERIA: NEGATIVE /HPF
BILIRUBIN URINE: NEGATIVE MG/DL
BLOOD, URINE: NEGATIVE
CHP ED QC CHECK: NORMAL
CLARITY: ABNORMAL
COLOR: YELLOW
GLUCOSE, URINE: 250 MG/DL
GONADOTROPIN, CHORIONIC (HCG) QUANT: NORMAL UIU/ML
KETONES, URINE: ABNORMAL MG/DL
LEUKOCYTE ESTERASE, URINE: NEGATIVE
MUCUS: ABNORMAL HPF
NITRITE URINE, QUANTITATIVE: NEGATIVE
PH, URINE: 6 (ref 5–8)
PREGNANCY TEST URINE, POC: POSITIVE
PROTEIN UA: 30 MG/DL
RBC URINE: 2 /HPF (ref 0–6)
SPECIFIC GRAVITY UA: >1.03 (ref 1–1.03)
SQUAMOUS EPITHELIAL: 11 /HPF
TRICHOMONAS: ABNORMAL /HPF
UROBILINOGEN, URINE: 0.2 MG/DL (ref 0.2–1)
WBC UA: 6 /HPF (ref 0–5)

## 2023-07-07 PROCEDURE — 99205 OFFICE O/P NEW HI 60 MIN: CPT

## 2023-07-07 PROCEDURE — 84702 CHORIONIC GONADOTROPIN TEST: CPT

## 2023-07-07 PROCEDURE — 99214 OFFICE O/P EST MOD 30 MIN: CPT

## 2023-07-07 PROCEDURE — 76817 TRANSVAGINAL US OBSTETRIC: CPT

## 2023-07-07 PROCEDURE — 99285 EMERGENCY DEPT VISIT HI MDM: CPT

## 2023-07-07 PROCEDURE — 93975 VASCULAR STUDY: CPT

## 2023-07-07 PROCEDURE — 81001 URINALYSIS AUTO W/SCOPE: CPT

## 2023-07-07 ASSESSMENT — LIFESTYLE VARIABLES
HOW OFTEN DO YOU HAVE A DRINK CONTAINING ALCOHOL: NEVER
HOW MANY STANDARD DRINKS CONTAINING ALCOHOL DO YOU HAVE ON A TYPICAL DAY: PATIENT DOES NOT DRINK

## 2023-07-07 NOTE — ED PROVIDER NOTES
Emergency Department Encounter  Location: [facility] EMERGENCY DEPARTMENT    Patient: Cassie Pelaez  MRN: [id number]  : 1985  Date of evaluation: 2023  ED Provider: Marley Krishnamurthy DO    Chief Complaint:    Abdominal Pain (States started over a week ago. )    [place]:  Cassie Pelaez is a 41 y.o. female that presents to the emergency department with concern for abdominal pain for the past week. Patient describes lower abdominal discomfort that is not radiating. She denies associated vomiting or diarrhea. No measured fever or chills. No reported urinary complaints. She states she lost a baby in February, had a period in March and again in April. None since then. No irregular vaginal bleeding or discharge. Patient is  4 para 1-1-1-1. No past medical history on file. No past surgical history on file. No family history on file. Social History     Socioeconomic History    Marital status: Single     Spouse name: Not on file    Number of children: Not on file    Years of education: Not on file    Highest education level: Not on file   Occupational History    Not on file   Tobacco Use    Smoking status: Never    Smokeless tobacco: Never   Vaping Use    Vaping Use: Never used   Substance and Sexual Activity    Alcohol use: Never    Drug use: Never    Sexual activity: Not on file   Other Topics Concern    Not on file   Social History Narrative    Not on file     Social Determinants of Health     Financial Resource Strain: Not on file   Food Insecurity: Not on file   Transportation Needs: Not on file   Physical Activity: Not on file   Stress: Not on file   Social Connections: Not on file   Intimate Partner Violence: Not on file   Housing Stability: Not on file     No current facility-administered medications for this encounter.      Current Outpatient Medications   Medication Sig Dispense Refill    acetaminophen (TYLENOL) 500 MG tablet Take 1 tablet by

## 2023-07-07 NOTE — PROGRESS NOTES
Pt complaining of abd pain, 6/10. POC reviewed, labs drawn, UA collected, awaiting US, pt verbalized understanding.

## 2023-07-07 NOTE — PROGRESS NOTES
Discharge instructions reviewed with pt, pt verbalized understanding, ambulated off unit in no distress.

## 2023-07-10 VITALS
HEART RATE: 85 BPM | SYSTOLIC BLOOD PRESSURE: 134 MMHG | WEIGHT: 203 LBS | TEMPERATURE: 97.8 F | OXYGEN SATURATION: 99 % | BODY MASS INDEX: 34.84 KG/M2 | RESPIRATION RATE: 16 BRPM | DIASTOLIC BLOOD PRESSURE: 79 MMHG

## 2023-07-14 ENCOUNTER — HOSPITAL ENCOUNTER (OUTPATIENT)
Age: 38
Discharge: HOME | End: 2023-07-14
Attending: OBSTETRICS & GYNECOLOGY | Admitting: OBSTETRICS & GYNECOLOGY
Payer: COMMERCIAL

## 2023-07-14 VITALS
DIASTOLIC BLOOD PRESSURE: 77 MMHG | SYSTOLIC BLOOD PRESSURE: 141 MMHG | RESPIRATION RATE: 16 BRPM | TEMPERATURE: 98 F | OXYGEN SATURATION: 99 %

## 2023-07-14 LAB
AMPHETAMINES: NEGATIVE
BACTERIA: NEGATIVE /HPF
BARBITURATE SCREEN URINE: NEGATIVE
BENZODIAZEPINE SCREEN, URINE: NEGATIVE
BILIRUBIN URINE: NEGATIVE MG/DL
BLOOD, URINE: ABNORMAL
CANNABINOID SCREEN URINE: NEGATIVE
CLARITY: CLEAR
COCAINE METABOLITE: NEGATIVE
COLOR: YELLOW
FENTANYL URINE: NEGATIVE
GLUCOSE, URINE: NEGATIVE MG/DL
HCT VFR BLD CALC: 33.8 % (ref 37–47)
HEMOGLOBIN: 11.2 GM/DL (ref 12.5–16)
KETONES, URINE: NEGATIVE MG/DL
LEUKOCYTE ESTERASE, URINE: ABNORMAL
MCH RBC QN AUTO: 29.4 PG (ref 27–31)
MCHC RBC AUTO-ENTMCNC: 33.1 % (ref 32–36)
MCV RBC AUTO: 88.7 FL (ref 78–100)
NITRITE URINE, QUANTITATIVE: NEGATIVE
OPIATES, URINE: NEGATIVE
OXYCODONE, OPI5M: NEGATIVE
PDW BLD-RTO: 12 % (ref 11.7–14.9)
PH, URINE: 6 (ref 5–8)
PLATELET # BLD: 344 K/CU MM (ref 140–440)
PMV BLD AUTO: 8.3 FL (ref 7.5–11.1)
PROTEIN UA: NEGATIVE MG/DL
RBC # BLD: 3.81 M/CU MM (ref 4.2–5.4)
RBC URINE: <1 /HPF (ref 0–6)
SPECIFIC GRAVITY UA: 1.02 (ref 1–1.03)
SQUAMOUS EPITHELIAL: 9 /HPF
TRICHOMONAS: NORMAL /HPF
UROBILINOGEN, URINE: 0.2 MG/DL (ref 0.2–1)
WBC # BLD: 7.5 K/CU MM (ref 4–10.5)
WBC UA: <1 /HPF (ref 0–5)

## 2023-07-14 PROCEDURE — 99213 OFFICE O/P EST LOW 20 MIN: CPT

## 2023-07-14 PROCEDURE — 85027 COMPLETE CBC AUTOMATED: CPT

## 2023-07-14 PROCEDURE — 4500000002 HC ER NO CHARGE

## 2023-07-14 PROCEDURE — 80307 DRUG TEST PRSMV CHEM ANLYZR: CPT

## 2023-07-14 PROCEDURE — 81001 URINALYSIS AUTO W/SCOPE: CPT

## 2023-07-14 NOTE — FLOWSHEET NOTE
Assessment complete, abd soft and non tender. Pt denies any vaginal bleeding or leaking of fluid. States she has had abdominal cramping in the middle of her stomach for around 4 hours, has taken tylenol but vomitted it up. Denies any pain or burning with urination. Instructed patient on tentative POC, pt voiced understanding.

## 2023-07-14 NOTE — FLOWSHEET NOTE
Pt arrives to unit via wheelchair from the ED with complaints of abdominal pain. Pt oriented to LT04 and instructed to put on gown and provide a urine sample.

## 2023-07-14 NOTE — FLOWSHEET NOTE
Discharge instructions given. Pt voiced understanding. Ambulatory off unit with no s/s of distress noted.

## 2023-11-11 ENCOUNTER — HOSPITAL ENCOUNTER (OUTPATIENT)
Age: 38
Discharge: HOME | End: 2023-11-11
Attending: OBSTETRICS & GYNECOLOGY | Admitting: OBSTETRICS & GYNECOLOGY
Payer: COMMERCIAL

## 2023-11-11 VITALS
HEART RATE: 86 BPM | OXYGEN SATURATION: 98 % | SYSTOLIC BLOOD PRESSURE: 123 MMHG | RESPIRATION RATE: 18 BRPM | TEMPERATURE: 99.6 F | DIASTOLIC BLOOD PRESSURE: 59 MMHG

## 2023-11-11 PROCEDURE — 6370000000 HC RX 637 (ALT 250 FOR IP): Performed by: OBSTETRICS & GYNECOLOGY

## 2023-11-11 PROCEDURE — 99213 OFFICE O/P EST LOW 20 MIN: CPT

## 2023-11-11 RX ORDER — ACETAMINOPHEN 500 MG
1000 TABLET ORAL
Status: COMPLETED | OUTPATIENT
Start: 2023-11-11 | End: 2023-11-11

## 2023-11-11 RX ORDER — INSULIN LISPRO 100 [IU]/ML
16 INJECTION, SOLUTION INTRAVENOUS; SUBCUTANEOUS
COMMUNITY

## 2023-11-11 RX ORDER — INSULIN GLARGINE 100 [IU]/ML
24 INJECTION, SOLUTION SUBCUTANEOUS NIGHTLY
COMMUNITY

## 2023-11-11 RX ADMIN — ACETAMINOPHEN 1000 MG: 500 TABLET ORAL at 21:28

## 2023-11-11 ASSESSMENT — PAIN DESCRIPTION - DESCRIPTORS
DESCRIPTORS: ACHING
DESCRIPTORS: ACHING

## 2023-11-11 ASSESSMENT — PAIN DESCRIPTION - LOCATION: LOCATION: ELBOW;BUTTOCKS

## 2023-11-11 ASSESSMENT — PAIN SCALES - GENERAL: PAINLEVEL_OUTOF10: 3

## 2023-11-11 ASSESSMENT — PAIN DESCRIPTION - ORIENTATION: ORIENTATION: LEFT

## 2023-11-11 ASSESSMENT — PAIN - FUNCTIONAL ASSESSMENT: PAIN_FUNCTIONAL_ASSESSMENT: ACTIVITIES ARE NOT PREVENTED

## 2023-11-12 NOTE — FLOWSHEET NOTE
Vital signs taken; [place] interpretor used-session code given; patient reports that @ 5:10pm while walking down stairs she fell on her butt; denies hitting her abdomen, no vaginal bleeding or leaking, she reports feeling baby move before she fell, and after the fell she said she didn't feel baby move as much. Abdomen soft and non-tender upon palpation. 1912- EFM and [place] placed on patient; fetal movement noted.

## 2023-11-12 NOTE — FLOWSHEET NOTE
TR to Dr. Dionisio Romero with patient arrival, ALICIA, OB hx, c/o a fall, EFM and toco tracings reviewed, no ctx's, no vaginal bleeding, orders received to offer patient Tylenol for sore elbow and butt; then ok to discharge.

## 2023-11-12 NOTE — FLOWSHEET NOTE
Tylenol given as ordered, and discharge paperwork given to patient; informed to always return to [facility] with any vaginal bleeding, leaking of fluid, and decreased fetal movement; she verbalized understanding.

## 2023-11-12 NOTE — DISCHARGE INSTRUCTIONS
LABOR    Call your doctor if you have these signs:     Regular tightening of the uterus coming as often as once every 15 minutes     Menstrual-like cramps, they may be regular, or may be continuous and move to   your back. A low, dull backache different from what you normally experience. It may come and go. Vaginal leaking of fluid. Any bleeding from your vagina. Pain, burning or bleeding when you urinate. LABOR    Call your doctor, or come to the hospital, if you have:    Contractions coming about every 3-5 minutes apart, for about one hour. Labor contractions are usually strong enough that you can not walk or talk while having the contractions. ( Contractions can feel like menstrual cramps, tightening in your abdomen, rectal pressure or a backache. This feeling comes and goes.)    Vaginal leaking of fluid. Heavy, bright red bleeding, like the days of your period. ( A little bloody show or spotting is normal in labor.)    ALWAYS CALL YOUR DOCTOR IF YOU:    Notice decreased movement of your baby, different from what you are used to. Have heavy, bright red bleeding, like the heavy days of your periods. Have vaginal leaking of fluid. Keep your next appointment in the [facility] office or to birthing center as needed.      Activity as tolerated    Diet as tolerated

## 2023-12-23 ENCOUNTER — HOSPITAL ENCOUNTER (OUTPATIENT)
Age: 38
Discharge: HOME | End: 2023-12-23
Attending: OBSTETRICS & GYNECOLOGY | Admitting: STUDENT IN AN ORGANIZED HEALTH CARE EDUCATION/TRAINING PROGRAM
Payer: COMMERCIAL

## 2023-12-23 VITALS
DIASTOLIC BLOOD PRESSURE: 51 MMHG | OXYGEN SATURATION: 99 % | HEART RATE: 90 BPM | BODY MASS INDEX: 37.59 KG/M2 | SYSTOLIC BLOOD PRESSURE: 101 MMHG | TEMPERATURE: 98.2 F | RESPIRATION RATE: 15 BRPM | WEIGHT: 219 LBS

## 2023-12-23 PROBLEM — O26.893 PELVIC PAIN AFFECTING PREGNANCY IN THIRD TRIMESTER, ANTEPARTUM: Status: ACTIVE | Noted: 2023-12-23

## 2023-12-23 PROBLEM — R10.2 PELVIC PAIN AFFECTING PREGNANCY IN THIRD TRIMESTER, ANTEPARTUM: Status: ACTIVE | Noted: 2023-12-23

## 2023-12-23 LAB
AMPHETAMINES: NEGATIVE
BARBITURATE SCREEN URINE: NEGATIVE
BENZODIAZEPINE SCREEN, URINE: NEGATIVE
BILIRUBIN URINE: NEGATIVE MG/DL
BLOOD, URINE: NEGATIVE
CANNABINOID SCREEN URINE: NEGATIVE
CLARITY: CLEAR
COCAINE METABOLITE: NEGATIVE
COLOR: YELLOW
COMMENT UA: NORMAL
FENTANYL URINE: NEGATIVE
GLUCOSE, URINE: NEGATIVE MG/DL
KETONES, URINE: NEGATIVE MG/DL
LEUKOCYTE ESTERASE, URINE: NEGATIVE
NITRITE URINE, QUANTITATIVE: NEGATIVE
OPIATES, URINE: NEGATIVE
OXYCODONE: NEGATIVE
PH, URINE: 6.5 (ref 5–8)
PROTEIN UA: NEGATIVE MG/DL
SPECIFIC GRAVITY UA: 1.02 (ref 1–1.03)
UROBILINOGEN, URINE: 0.2 MG/DL (ref 0.2–1)

## 2023-12-23 PROCEDURE — 81003 URINALYSIS AUTO W/O SCOPE: CPT

## 2023-12-23 PROCEDURE — 6370000000 HC RX 637 (ALT 250 FOR IP): Performed by: ADVANCED PRACTICE MIDWIFE

## 2023-12-23 PROCEDURE — 80307 DRUG TEST PRSMV CHEM ANLYZR: CPT

## 2023-12-23 PROCEDURE — 99213 OFFICE O/P EST LOW 20 MIN: CPT

## 2023-12-23 RX ORDER — ACETAMINOPHEN 500 MG
1000 TABLET ORAL EVERY 8 HOURS SCHEDULED
Status: DISCONTINUED | OUTPATIENT
Start: 2023-12-23 | End: 2023-12-23 | Stop reason: HOSPADM

## 2023-12-23 RX ADMIN — ACETAMINOPHEN 1000 MG: 500 TABLET ORAL at 10:41

## 2023-12-23 NOTE — FLOWSHEET NOTE
Pt c/o constant lower abdominal pain 7/10, stiff pain, hard to walk since yesterday. Pt abdomin soft, non-tender to touch. Pt denies any urinary symptoms. Urine collected, sent to lab.

## 2023-12-23 NOTE — CONSULTS
Session ID: [id number]  Language: [facility]   ID: #[id number]   Name: Allison Baxter

## 2023-12-23 NOTE — FLOWSHEET NOTE
Pt presents ambulatory to Labor & Delivery with a steady gait. Pt denies any vaginal bleeding, leaking of fluid, or tenderness. Pt states she is feeling fetal movement. Pt oriented to room and call light. Call light within reach and bed in lowest position, with wheels locked.

## 2023-12-23 NOTE — FLOWSHEET NOTE
Discharge instructions given to pt, pt verbalized understanding. All questions answered. Follow-up instructions given. Pt discharged at this time via ambulating per her request. Ambulating with steady gait. Condition stable. Pt verbalizes understanding to make appointment and to keep appointment with [facility] doctor.

## 2023-12-26 ENCOUNTER — HOSPITAL ENCOUNTER (OUTPATIENT)
Age: 38
Discharge: HOME | End: 2023-12-26
Attending: OBSTETRICS & GYNECOLOGY | Admitting: OBSTETRICS & GYNECOLOGY
Payer: COMMERCIAL

## 2023-12-26 VITALS
OXYGEN SATURATION: 98 % | DIASTOLIC BLOOD PRESSURE: 64 MMHG | RESPIRATION RATE: 16 BRPM | HEART RATE: 99 BPM | SYSTOLIC BLOOD PRESSURE: 117 MMHG | TEMPERATURE: 98 F

## 2023-12-26 PROBLEM — O26.90 PREGNANCY-RELATED COMPLAINT: Status: ACTIVE | Noted: 2023-12-26

## 2023-12-26 PROBLEM — O09.93 SUPERVISION OF HIGH RISK PREGNANCY IN THIRD TRIMESTER: Status: ACTIVE | Noted: 2023-12-26

## 2023-12-26 PROCEDURE — 59025 FETAL NON-STRESS TEST: CPT

## 2024-01-01 ENCOUNTER — HOSPITAL ENCOUNTER (OUTPATIENT)
Age: 39
Discharge: HOME | End: 2024-01-01
Attending: OBSTETRICS & GYNECOLOGY | Admitting: STUDENT IN AN ORGANIZED HEALTH CARE EDUCATION/TRAINING PROGRAM
Payer: COMMERCIAL

## 2024-01-01 VITALS
DIASTOLIC BLOOD PRESSURE: 59 MMHG | SYSTOLIC BLOOD PRESSURE: 121 MMHG | TEMPERATURE: 98.4 F | HEART RATE: 101 BPM | OXYGEN SATURATION: 97 % | RESPIRATION RATE: 16 BRPM

## 2024-01-01 PROCEDURE — 59025 FETAL NON-STRESS TEST: CPT

## 2024-01-01 PROCEDURE — 59025 FETAL NON-STRESS TEST: CPT | Performed by: ADVANCED PRACTICE MIDWIFE

## 2024-01-01 NOTE — DISCHARGE INSTRUCTIONS
OB DISCHARGE INSTRUCTIONS  [unfilled] 12:33 PM     Discharge Disposition: *** Home *** May take Tylenol for pain.     Labor   Regular tightening of the uterus coming as often as once every 15 minutes.    Menstrual-like cramps, they may be regular, or may be continuous and move to your back.       A low, dull backache different from what you normally experience.  It may come and go.    Vaginal leaking of fluid.    Any bleeding from your vagina.    Pain, burning or bleeding when you urinate.     Labor  Call your doctor; or come to the hospital, if you have:   Contractions coming about every {NUMBERS 1-15:44322} minuets, for about one hour.  Labor contractions are usually strong enough that you cannot walk or talk while having contractions. (Contractions can feel like menstrual cramps, tightening in your abdomen, rectal pressure or a backache.  This feeling comes and goes.)    Vaginal leaking of fluid.    Heavy, bright red bleeding, like the heavy days of your period.  (A little bloody show is normal in labor.)     Always call your Doctor if you:   Notice decreased movement of your baby, different from what you are used to.    Have heavy, bright red bleeding, like the heavy days of your period.    Have vaginal leaking of watery fluid.        Your next appointment:___Keep your next appointment as scheduled____________ and return to L & D if needed._________________    Activity:  AS TOLERATED  Diet:  REGULAR  If you have any questions regarding your discharge instructions call us at [phone]  .

## 2024-01-01 NOTE — FLOWSHEET NOTE
Pt presents amb to unit for NST for DM. POC discussed. Abd soft and non tender. FM audible. Pt denies concerns at this time. Rayne BOUCHER notified of pt arrival.

## 2024-01-01 NOTE — PROGRESS NOTES
2024    Liam Mejias  1985    No chief complaint on file.       Liam Mejias is a 38 y.o. female who presents today for NST because of diabetes.  The baseline fetal heart rate is 145.  It is category I.  The variability is moderate.  Decelerations are absent.  Accelerations are 15 beats/min or greater      No current facility-administered medications for this encounter.       Allergies   Allergen Reactions    Penicillins            BP (!) 121/59   Pulse (!) 101   Temp 98.4 °F (36.9 °C) (Temporal)   Resp 16   LMP 2023 (Exact Date)   SpO2 97%         ASSESSMENT AND PLAN  37 yo  at 35.2 weeks  Cat I FHR tracing; reactive NST   GDM  AMA     Will discharge patient home for outpatient f/u with primary OB.       CUAUHTEMOC Vazquez CNM

## 2024-01-08 ENCOUNTER — HOSPITAL ENCOUNTER (OUTPATIENT)
Age: 39
Discharge: HOME | End: 2024-01-08
Attending: OBSTETRICS & GYNECOLOGY | Admitting: STUDENT IN AN ORGANIZED HEALTH CARE EDUCATION/TRAINING PROGRAM
Payer: COMMERCIAL

## 2024-01-08 VITALS
DIASTOLIC BLOOD PRESSURE: 70 MMHG | TEMPERATURE: 98 F | RESPIRATION RATE: 16 BRPM | SYSTOLIC BLOOD PRESSURE: 120 MMHG | OXYGEN SATURATION: 100 % | HEART RATE: 98 BPM

## 2024-01-08 PROCEDURE — 59025 FETAL NON-STRESS TEST: CPT | Performed by: ADVANCED PRACTICE MIDWIFE

## 2024-01-08 PROCEDURE — 59025 FETAL NON-STRESS TEST: CPT

## 2024-01-08 RX ORDER — BLOOD SUGAR DIAGNOSTIC
STRIP MISCELLANEOUS
COMMUNITY
Start: 2023-11-27

## 2024-01-08 RX ORDER — INSULIN GLARGINE 100 [IU]/ML
INJECTION, SOLUTION SUBCUTANEOUS
COMMUNITY
Start: 2023-12-08

## 2024-01-08 RX ORDER — FLUCONAZOLE 150 MG/1
TABLET ORAL
COMMUNITY
Start: 2023-10-13

## 2024-01-08 RX ORDER — INSULIN LISPRO 100 [IU]/ML
INJECTION, SOLUTION INTRAVENOUS; SUBCUTANEOUS
COMMUNITY
Start: 2023-11-09

## 2024-01-08 NOTE — DISCHARGE INSTRUCTIONS
LABOR    Call your doctor if you have these signs:     Regular tightening of the uterus coming as often as once every 15 minutes     Menstrual-like cramps, they may be regular, or may be continuous and move to   your back.     A low, dull backache different from what you normally experience. It may come and go.    Vaginal leaking of fluid.     Any bleeding from your vagina.    Pain, burning or bleeding when you urinate.      LABOR    Call your doctor, or come to the hospital, if you have:    Contractions coming about every 3-5 minutes apart, for about one hour. Labor contractions are usually strong enough that you can not walk or talk while having the contractions. ( Contractions can feel like menstrual cramps, tightening in your abdomen, rectal pressure or a backache. This feeling comes and goes.)    Vaginal leaking of fluid.    Heavy, bright red bleeding, like the days of your period. ( A little bloody show or spotting is normal in labor.)    ALWAYS CALL YOUR DOCTOR IF YOU:    Notice decreased movement of your baby, different from what you are used to.    Have heavy, bright red bleeding, like the heavy days of your periods.    Have vaginal leaking of fluid.

## 2024-01-08 NOTE — PROGRESS NOTES
Department of Obstetrics and Gynecology  Labor and Delivery  TRIAGE NOTE      SUBJECTIVE:    Chief Complaint   Patient presents with    Non-stress Test     AMA and Diabetes     PT reports to triage for NST due to DM. PT denies Vb, LOF. +FM.     OBJECTIVE    Vitals:  BP (!) 102/54   Pulse (!) 104   Temp 98 °F (36.7 °C) (Temporal)   Resp 16   LMP 2023 (Exact Date)   SpO2 97%       CONSTITUTIONAL:  negative  RESPIRATORY:  negative  CARDIOVASCULAR:  negative  GASTROINTESTINAL:  negative  ALLERGIC/IMMUNOLOGIC:  negative  NEUROLOGICAL:  negative  BEHAVIOR/PSYCH:  negative    Physical Exam  Constitutional:       Appearance: Normal appearance.   Pulmonary:      Effort: Pulmonary effort is normal.   Neurological:      Mental Status: She is alert and oriented to person, place, and time.   Skin:     General: Skin is warm.   Psychiatric:         Mood and Affect: Mood normal.         Behavior: Behavior normal.   Vitals reviewed.           Cervix:  deferred    Fetal heart rate:        Baseline FHR :    145 bpm              Fetal Accelerations:  present        Fetal Decelerations:  absent        Fetal Variability:   moderate    Contraction frequency:  quiet    DATA:  Lab Results   Component Value Date    WBC 7.5 2023    HGB 11.2 (L) 2023    HCT 33.8 (L) 2023    MCV 88.7 2023     2023     Reactive NST     ASSESSMENT:       38 y.o. ,  patient at 36w2d gestation  Here for NST for DM.  SVE deferred  Denies VB, LOF  FHT- reactive      PLAN: Pt to be discharged home with PTL precautions.    Pt educated on PTL precautions.  Consult with Dr. Hugo on HPI and assessment complete and MD agrees with POC.    CUAUHTEMOC Anthony CNM

## 2024-01-15 ENCOUNTER — HOSPITAL ENCOUNTER (OUTPATIENT)
Age: 39
Discharge: HOME | End: 2024-01-15
Attending: OBSTETRICS & GYNECOLOGY | Admitting: OBSTETRICS & GYNECOLOGY
Payer: COMMERCIAL

## 2024-01-15 VITALS
OXYGEN SATURATION: 99 % | SYSTOLIC BLOOD PRESSURE: 122 MMHG | TEMPERATURE: 97.8 F | HEART RATE: 107 BPM | RESPIRATION RATE: 16 BRPM | DIASTOLIC BLOOD PRESSURE: 66 MMHG

## 2024-01-15 PROCEDURE — 59025 FETAL NON-STRESS TEST: CPT

## 2024-01-15 PROCEDURE — 99211 OFF/OP EST MAY X REQ PHY/QHP: CPT

## 2024-01-15 NOTE — FLOWSHEET NOTE
1050-pt on unit for NST  for HX of DM. Will place on monitors and notify CNM.     1140- M Joe BOUCHER reviewed fetal strip, reactive NST is noted, rec'd d/c home orders.     1200- d/c inst reviewed and explained to pt. Pt verbalized understanding. Ambulated off unit, NAD noted.

## 2024-01-15 NOTE — PROGRESS NOTES
[unfilled]    iLam Mejias  1985    Chief Complaint   Patient presents with    Non-stress Test        Liam Mejias is a 38 y.o. female who presents today for NST because of diabetes and AMA .  The baseline fetal heart rate is 135.  It is category I.  The variability is moderate.  Decelerations are absent.  Accelerations are 15 beats/min or greater      No current facility-administered medications for this encounter.       Allergies   Allergen Reactions    Penicillins            /66   Pulse (!) 107   Temp 97.8 °F (36.6 °C) (Temporal)   Resp 16   LMP 2023 (Exact Date)   SpO2 99%     Physical Exam    ASSESSMENT AND PLAN    AMA in pregnancy  Diabetes in pregnancy  37 weeks in pregnancy     Reactive NST    Pt to follow in office.    CUAUHTEMOC Anthony CNM

## 2024-01-19 ENCOUNTER — ANESTHESIA (OUTPATIENT)
Dept: LABOR AND DELIVERY | Age: 39
End: 2024-01-19
Payer: COMMERCIAL

## 2024-01-19 ENCOUNTER — ANESTHESIA EVENT (OUTPATIENT)
Dept: LABOR AND DELIVERY | Age: 39
End: 2024-01-19
Payer: COMMERCIAL

## 2024-01-19 ENCOUNTER — HOSPITAL ENCOUNTER (INPATIENT)
Age: 39
LOS: 3 days | Discharge: HOME | End: 2024-01-22
Attending: OBSTETRICS & GYNECOLOGY | Admitting: OBSTETRICS & GYNECOLOGY
Payer: COMMERCIAL

## 2024-01-19 PROBLEM — O34.219 PREVIOUS CESAREAN DELIVERY AFFECTING PREGNANCY: Status: ACTIVE | Noted: 2024-01-19

## 2024-01-19 PROBLEM — Z3A.37 37 WEEKS GESTATION OF PREGNANCY: Status: ACTIVE | Noted: 2024-01-19

## 2024-01-19 PROBLEM — O24.319 PRE-EXISTING DIABETES MELLITUS DURING PREGNANCY, ANTEPARTUM: Status: ACTIVE | Noted: 2024-01-19

## 2024-01-19 LAB
ABO/RH: NORMAL
AMPHETAMINES: NEGATIVE
ANTIBODY SCREEN: NEGATIVE
BACTERIA: NEGATIVE /HPF
BARBITURATE SCREEN URINE: NEGATIVE
BENZODIAZEPINE SCREEN, URINE: NEGATIVE
BILIRUBIN URINE: NEGATIVE MG/DL
BLOOD, URINE: ABNORMAL
CANNABINOID SCREEN URINE: NEGATIVE
CLARITY: CLEAR
COCAINE METABOLITE: NEGATIVE
COLOR: YELLOW
COMMENT: NORMAL
FENTANYL URINE: NEGATIVE
GLUCOSE BLD-MCNC: 100 MG/DL (ref 70–99)
GLUCOSE BLD-MCNC: 185 MG/DL (ref 70–99)
GLUCOSE BLD-MCNC: 210 MG/DL (ref 70–99)
GLUCOSE BLD-MCNC: 99 MG/DL (ref 70–99)
GLUCOSE, URINE: NEGATIVE MG/DL
HCT VFR BLD CALC: 30.9 % (ref 37–47)
HEMOGLOBIN: 10.4 GM/DL (ref 12.5–16)
KETONES, URINE: NEGATIVE MG/DL
LEUKOCYTE ESTERASE, URINE: ABNORMAL
MCH RBC QN AUTO: 31 PG (ref 27–31)
MCHC RBC AUTO-ENTMCNC: 33.7 % (ref 32–36)
MCV RBC AUTO: 92.2 FL (ref 78–100)
MUCUS: ABNORMAL HPF
NITRITE URINE, QUANTITATIVE: NEGATIVE
NON SQUAM EPI CELLS: <1 /HPF
OPIATES, URINE: NEGATIVE
OXYCODONE: NEGATIVE
PDW BLD-RTO: 12.3 % (ref 11.7–14.9)
PH, URINE: 6.5 (ref 5–8)
PLATELET # BLD: 332 K/CU MM (ref 140–440)
PMV BLD AUTO: 8.9 FL (ref 7.5–11.1)
PROTEIN UA: ABNORMAL MG/DL
RBC # BLD: 3.35 M/CU MM (ref 4.2–5.4)
RBC URINE: 5 /HPF (ref 0–6)
SPECIFIC GRAVITY UA: 1.02 (ref 1–1.03)
SQUAMOUS EPITHELIAL: 10 /HPF
T. PALLIDUM SCREEN: NEGATIVE
TRICHOMONAS: ABNORMAL /HPF
UROBILINOGEN, URINE: 0.2 MG/DL (ref 0.2–1)
WBC # BLD: 6.6 K/CU MM (ref 4–10.5)
WBC UA: 4 /HPF (ref 0–5)

## 2024-01-19 PROCEDURE — 6360000002 HC RX W HCPCS: Performed by: OBSTETRICS & GYNECOLOGY

## 2024-01-19 PROCEDURE — 2500000003 HC RX 250 WO HCPCS: Performed by: OBSTETRICS & GYNECOLOGY

## 2024-01-19 PROCEDURE — 2709999900 HC NON-CHARGEABLE SUPPLY: Performed by: OBSTETRICS & GYNECOLOGY

## 2024-01-19 PROCEDURE — 6370000000 HC RX 637 (ALT 250 FOR IP): Performed by: OBSTETRICS & GYNECOLOGY

## 2024-01-19 PROCEDURE — 7100000000 HC PACU RECOVERY - FIRST 15 MIN: Performed by: OBSTETRICS & GYNECOLOGY

## 2024-01-19 PROCEDURE — 2580000003 HC RX 258: Performed by: OBSTETRICS & GYNECOLOGY

## 2024-01-19 PROCEDURE — 3609079900 HC CESAREAN SECTION: Performed by: OBSTETRICS & GYNECOLOGY

## 2024-01-19 PROCEDURE — 86901 BLOOD TYPING SEROLOGIC RH(D): CPT

## 2024-01-19 PROCEDURE — 86780 TREPONEMA PALLIDUM: CPT

## 2024-01-19 PROCEDURE — 7100000001 HC PACU RECOVERY - ADDTL 15 MIN: Performed by: OBSTETRICS & GYNECOLOGY

## 2024-01-19 PROCEDURE — 80307 DRUG TEST PRSMV CHEM ANLYZR: CPT

## 2024-01-19 PROCEDURE — 1220000000 HC SEMI PRIVATE OB R&B

## 2024-01-19 PROCEDURE — 59514 CESAREAN DELIVERY ONLY: CPT

## 2024-01-19 PROCEDURE — 6360000002 HC RX W HCPCS: Performed by: NURSE ANESTHETIST, CERTIFIED REGISTERED

## 2024-01-19 PROCEDURE — 85027 COMPLETE CBC AUTOMATED: CPT

## 2024-01-19 PROCEDURE — 2580000003 HC RX 258: Performed by: NURSE ANESTHETIST, CERTIFIED REGISTERED

## 2024-01-19 PROCEDURE — 82962 GLUCOSE BLOOD TEST: CPT

## 2024-01-19 PROCEDURE — 86900 BLOOD TYPING SEROLOGIC ABO: CPT

## 2024-01-19 PROCEDURE — 3700000001 HC ADD 15 MINUTES (ANESTHESIA): Performed by: OBSTETRICS & GYNECOLOGY

## 2024-01-19 PROCEDURE — 59514 CESAREAN DELIVERY ONLY: CPT | Performed by: OBSTETRICS & GYNECOLOGY

## 2024-01-19 PROCEDURE — 86850 RBC ANTIBODY SCREEN: CPT

## 2024-01-19 PROCEDURE — 3700000000 HC ANESTHESIA ATTENDED CARE: Performed by: OBSTETRICS & GYNECOLOGY

## 2024-01-19 PROCEDURE — 81001 URINALYSIS AUTO W/SCOPE: CPT

## 2024-01-19 RX ORDER — SODIUM CHLORIDE, SODIUM LACTATE, POTASSIUM CHLORIDE, AND CALCIUM CHLORIDE .6; .31; .03; .02 G/100ML; G/100ML; G/100ML; G/100ML
1000 INJECTION, SOLUTION INTRAVENOUS ONCE
Status: COMPLETED | OUTPATIENT
Start: 2024-01-19 | End: 2024-01-19

## 2024-01-19 RX ORDER — SODIUM CHLORIDE 9 MG/ML
INJECTION, SOLUTION INTRAVENOUS PRN
Status: DISCONTINUED | OUTPATIENT
Start: 2024-01-19 | End: 2024-01-22 | Stop reason: HOSPADM

## 2024-01-19 RX ORDER — ONDANSETRON 2 MG/ML
4 INJECTION INTRAMUSCULAR; INTRAVENOUS ONCE
Status: COMPLETED | OUTPATIENT
Start: 2024-01-19 | End: 2024-01-19

## 2024-01-19 RX ORDER — METHYLERGONOVINE MALEATE 0.2 MG/ML
200 INJECTION INTRAVENOUS PRN
Status: DISCONTINUED | OUTPATIENT
Start: 2024-01-19 | End: 2024-01-22 | Stop reason: HOSPADM

## 2024-01-19 RX ORDER — FAMOTIDINE 10 MG/ML
20 INJECTION, SOLUTION INTRAVENOUS ONCE
Status: COMPLETED | OUTPATIENT
Start: 2024-01-19 | End: 2024-01-19

## 2024-01-19 RX ORDER — CITRIC ACID/SODIUM CITRATE 334-500MG
30 SOLUTION, ORAL ORAL ONCE
Status: COMPLETED | OUTPATIENT
Start: 2024-01-19 | End: 2024-01-19

## 2024-01-19 RX ORDER — CARBOPROST TROMETHAMINE 250 UG/ML
250 INJECTION, SOLUTION INTRAMUSCULAR PRN
Status: DISCONTINUED | OUTPATIENT
Start: 2024-01-19 | End: 2024-01-22 | Stop reason: HOSPADM

## 2024-01-19 RX ORDER — KETOROLAC TROMETHAMINE 30 MG/ML
INJECTION, SOLUTION INTRAMUSCULAR; INTRAVENOUS PRN
Status: DISCONTINUED | OUTPATIENT
Start: 2024-01-19 | End: 2024-01-19 | Stop reason: SDUPTHER

## 2024-01-19 RX ORDER — OXYCODONE HYDROCHLORIDE 5 MG/1
5 TABLET ORAL EVERY 4 HOURS PRN
Status: DISCONTINUED | OUTPATIENT
Start: 2024-01-19 | End: 2024-01-22 | Stop reason: HOSPADM

## 2024-01-19 RX ORDER — ACETAMINOPHEN 325 MG/1
975 TABLET ORAL ONCE
Status: COMPLETED | OUTPATIENT
Start: 2024-01-19 | End: 2024-01-19

## 2024-01-19 RX ORDER — CEFAZOLIN SODIUM 1 G/50ML
INJECTION, SOLUTION INTRAVENOUS PRN
Status: DISCONTINUED | OUTPATIENT
Start: 2024-01-19 | End: 2024-01-19 | Stop reason: SDUPTHER

## 2024-01-19 RX ORDER — DEXAMETHASONE SODIUM PHOSPHATE 4 MG/ML
INJECTION, SOLUTION INTRA-ARTICULAR; INTRALESIONAL; INTRAMUSCULAR; INTRAVENOUS; SOFT TISSUE PRN
Status: DISCONTINUED | OUTPATIENT
Start: 2024-01-19 | End: 2024-01-19 | Stop reason: SDUPTHER

## 2024-01-19 RX ORDER — ONDANSETRON 2 MG/ML
INJECTION INTRAMUSCULAR; INTRAVENOUS PRN
Status: DISCONTINUED | OUTPATIENT
Start: 2024-01-19 | End: 2024-01-19 | Stop reason: SDUPTHER

## 2024-01-19 RX ORDER — SODIUM CHLORIDE 0.9 % (FLUSH) 0.9 %
5-40 SYRINGE (ML) INJECTION PRN
Status: DISCONTINUED | OUTPATIENT
Start: 2024-01-19 | End: 2024-01-22 | Stop reason: HOSPADM

## 2024-01-19 RX ORDER — KETOROLAC TROMETHAMINE 30 MG/ML
30 INJECTION, SOLUTION INTRAMUSCULAR; INTRAVENOUS EVERY 6 HOURS PRN
Status: DISCONTINUED | OUTPATIENT
Start: 2024-01-19 | End: 2024-01-19 | Stop reason: HOSPADM

## 2024-01-19 RX ORDER — INSULIN LISPRO 100 [IU]/ML
0-4 INJECTION, SOLUTION INTRAVENOUS; SUBCUTANEOUS NIGHTLY
Status: DISCONTINUED | OUTPATIENT
Start: 2024-01-19 | End: 2024-01-22 | Stop reason: HOSPADM

## 2024-01-19 RX ORDER — FERROUS SULFATE 325(65) MG
325 TABLET ORAL EVERY OTHER DAY
Status: DISCONTINUED | OUTPATIENT
Start: 2024-01-19 | End: 2024-01-22 | Stop reason: HOSPADM

## 2024-01-19 RX ORDER — NALOXONE HYDROCHLORIDE 0.4 MG/ML
INJECTION, SOLUTION INTRAMUSCULAR; INTRAVENOUS; SUBCUTANEOUS PRN
Status: ACTIVE | OUTPATIENT
Start: 2024-01-19 | End: 2024-01-20

## 2024-01-19 RX ORDER — OXYCODONE HYDROCHLORIDE 5 MG/1
10 TABLET ORAL EVERY 4 HOURS PRN
Status: DISCONTINUED | OUTPATIENT
Start: 2024-01-19 | End: 2024-01-22 | Stop reason: HOSPADM

## 2024-01-19 RX ORDER — SODIUM CHLORIDE, SODIUM LACTATE, POTASSIUM CHLORIDE, CALCIUM CHLORIDE 600; 310; 30; 20 MG/100ML; MG/100ML; MG/100ML; MG/100ML
INJECTION, SOLUTION INTRAVENOUS CONTINUOUS
Status: DISCONTINUED | OUTPATIENT
Start: 2024-01-19 | End: 2024-01-22 | Stop reason: HOSPADM

## 2024-01-19 RX ORDER — ONDANSETRON 4 MG/1
4 TABLET, ORALLY DISINTEGRATING ORAL EVERY 8 HOURS PRN
Status: DISCONTINUED | OUTPATIENT
Start: 2024-01-19 | End: 2024-01-22 | Stop reason: HOSPADM

## 2024-01-19 RX ORDER — IBUPROFEN 800 MG/1
800 TABLET ORAL EVERY 8 HOURS SCHEDULED
Status: DISCONTINUED | OUTPATIENT
Start: 2024-01-19 | End: 2024-01-22 | Stop reason: HOSPADM

## 2024-01-19 RX ORDER — ONDANSETRON 2 MG/ML
4 INJECTION INTRAMUSCULAR; INTRAVENOUS EVERY 6 HOURS PRN
Status: DISCONTINUED | OUTPATIENT
Start: 2024-01-19 | End: 2024-01-22 | Stop reason: HOSPADM

## 2024-01-19 RX ORDER — SODIUM CHLORIDE 0.9 % (FLUSH) 0.9 %
5-40 SYRINGE (ML) INJECTION EVERY 12 HOURS SCHEDULED
Status: DISCONTINUED | OUTPATIENT
Start: 2024-01-19 | End: 2024-01-22 | Stop reason: HOSPADM

## 2024-01-19 RX ORDER — BUPIVACAINE HYDROCHLORIDE 7.5 MG/ML
INJECTION, SOLUTION INTRASPINAL
Status: COMPLETED | OUTPATIENT
Start: 2024-01-19 | End: 2024-01-19

## 2024-01-19 RX ORDER — LANOLIN 72 %
OINTMENT (GRAM) TOPICAL
Status: DISCONTINUED | OUTPATIENT
Start: 2024-01-19 | End: 2024-01-22 | Stop reason: HOSPADM

## 2024-01-19 RX ORDER — SODIUM CHLORIDE, SODIUM LACTATE, POTASSIUM CHLORIDE, CALCIUM CHLORIDE 600; 310; 30; 20 MG/100ML; MG/100ML; MG/100ML; MG/100ML
INJECTION, SOLUTION INTRAVENOUS CONTINUOUS
Status: DISCONTINUED | OUTPATIENT
Start: 2024-01-19 | End: 2024-01-19 | Stop reason: SDUPTHER

## 2024-01-19 RX ORDER — DEXTROSE MONOHYDRATE 100 MG/ML
INJECTION, SOLUTION INTRAVENOUS CONTINUOUS PRN
Status: DISCONTINUED | OUTPATIENT
Start: 2024-01-19 | End: 2024-01-22 | Stop reason: HOSPADM

## 2024-01-19 RX ORDER — DOCUSATE SODIUM 100 MG/1
100 CAPSULE, LIQUID FILLED ORAL 2 TIMES DAILY
Status: DISCONTINUED | OUTPATIENT
Start: 2024-01-19 | End: 2024-01-22 | Stop reason: HOSPADM

## 2024-01-19 RX ORDER — ENOXAPARIN SODIUM 100 MG/ML
30 INJECTION SUBCUTANEOUS 2 TIMES DAILY
Status: DISCONTINUED | OUTPATIENT
Start: 2024-01-19 | End: 2024-01-22 | Stop reason: HOSPADM

## 2024-01-19 RX ORDER — INSULIN LISPRO 100 [IU]/ML
0-16 INJECTION, SOLUTION INTRAVENOUS; SUBCUTANEOUS
Status: DISCONTINUED | OUTPATIENT
Start: 2024-01-19 | End: 2024-01-20

## 2024-01-19 RX ORDER — SODIUM CHLORIDE, SODIUM LACTATE, POTASSIUM CHLORIDE, CALCIUM CHLORIDE 600; 310; 30; 20 MG/100ML; MG/100ML; MG/100ML; MG/100ML
INJECTION, SOLUTION INTRAVENOUS CONTINUOUS PRN
Status: DISCONTINUED | OUTPATIENT
Start: 2024-01-19 | End: 2024-01-19 | Stop reason: SDUPTHER

## 2024-01-19 RX ORDER — GLUCAGON 1 MG/ML
1 KIT INJECTION PRN
Status: DISCONTINUED | OUTPATIENT
Start: 2024-01-19 | End: 2024-01-22 | Stop reason: HOSPADM

## 2024-01-19 RX ORDER — MORPHINE SULFATE 0.5 MG/ML
INJECTION, SOLUTION EPIDURAL; INTRATHECAL; INTRAVENOUS
Status: COMPLETED | OUTPATIENT
Start: 2024-01-19 | End: 2024-01-19

## 2024-01-19 RX ADMIN — SODIUM CHLORIDE, POTASSIUM CHLORIDE, SODIUM LACTATE AND CALCIUM CHLORIDE 1000 ML: 600; 310; 30; 20 INJECTION, SOLUTION INTRAVENOUS at 06:55

## 2024-01-19 RX ADMIN — BUPIVACAINE HYDROCHLORIDE IN DEXTROSE 13 MG: 7.5 INJECTION, SOLUTION SUBARACHNOID at 07:41

## 2024-01-19 RX ADMIN — KETOROLAC TROMETHAMINE 30 MG: 30 INJECTION, SOLUTION INTRAMUSCULAR; INTRAVENOUS at 08:32

## 2024-01-19 RX ADMIN — ONDANSETRON 4 MG: 2 INJECTION INTRAMUSCULAR; INTRAVENOUS at 08:11

## 2024-01-19 RX ADMIN — DEXAMETHASONE SODIUM PHOSPHATE 4 MG: 4 INJECTION, SOLUTION INTRAMUSCULAR; INTRAVENOUS at 08:11

## 2024-01-19 RX ADMIN — Medication 909 ML/HR: at 08:10

## 2024-01-19 RX ADMIN — PHENYLEPHRINE HYDROCHLORIDE 200 MCG: 10 INJECTION INTRAVENOUS at 07:49

## 2024-01-19 RX ADMIN — SODIUM CHLORIDE, POTASSIUM CHLORIDE, SODIUM LACTATE AND CALCIUM CHLORIDE: 600; 310; 30; 20 INJECTION, SOLUTION INTRAVENOUS at 07:31

## 2024-01-19 RX ADMIN — INSULIN LISPRO 4 UNITS: 100 INJECTION, SOLUTION INTRAVENOUS; SUBCUTANEOUS at 17:04

## 2024-01-19 RX ADMIN — PHENYLEPHRINE HYDROCHLORIDE 200 MCG: 10 INJECTION INTRAVENOUS at 07:45

## 2024-01-19 RX ADMIN — DOCUSATE SODIUM 100 MG: 100 CAPSULE, LIQUID FILLED ORAL at 21:24

## 2024-01-19 RX ADMIN — SODIUM CHLORIDE, POTASSIUM CHLORIDE, SODIUM LACTATE AND CALCIUM CHLORIDE: 600; 310; 30; 20 INJECTION, SOLUTION INTRAVENOUS at 07:39

## 2024-01-19 RX ADMIN — ONDANSETRON 4 MG: 2 INJECTION INTRAMUSCULAR; INTRAVENOUS at 07:28

## 2024-01-19 RX ADMIN — MORPHINE SULFATE 0.15 MG: 0.5 INJECTION, SOLUTION EPIDURAL; INTRATHECAL; INTRAVENOUS at 07:41

## 2024-01-19 RX ADMIN — PHENYLEPHRINE HYDROCHLORIDE 200 MCG: 10 INJECTION INTRAVENOUS at 07:54

## 2024-01-19 RX ADMIN — HYDROMORPHONE HYDROCHLORIDE 0.5 MG: 1 INJECTION, SOLUTION INTRAMUSCULAR; INTRAVENOUS; SUBCUTANEOUS at 08:56

## 2024-01-19 RX ADMIN — SODIUM CHLORIDE, POTASSIUM CHLORIDE, SODIUM LACTATE AND CALCIUM CHLORIDE: 600; 310; 30; 20 INJECTION, SOLUTION INTRAVENOUS at 10:29

## 2024-01-19 RX ADMIN — SODIUM CHLORIDE, POTASSIUM CHLORIDE, SODIUM LACTATE AND CALCIUM CHLORIDE: 600; 310; 30; 20 INJECTION, SOLUTION INTRAVENOUS at 18:08

## 2024-01-19 RX ADMIN — IBUPROFEN 800 MG: 800 TABLET, FILM COATED ORAL at 16:27

## 2024-01-19 RX ADMIN — PHENYLEPHRINE HYDROCHLORIDE 200 MCG: 10 INJECTION INTRAVENOUS at 08:02

## 2024-01-19 RX ADMIN — ACETAMINOPHEN 975 MG: 325 TABLET ORAL at 07:26

## 2024-01-19 RX ADMIN — FAMOTIDINE 20 MG: 10 INJECTION, SOLUTION INTRAVENOUS at 07:28

## 2024-01-19 RX ADMIN — SODIUM CITRATE AND CITRIC ACID MONOHYDRATE 30 ML: 500; 334 SOLUTION ORAL at 07:26

## 2024-01-19 RX ADMIN — CEFAZOLIN 2000 MG: 2 INJECTION, POWDER, FOR SOLUTION INTRAMUSCULAR; INTRAVENOUS at 07:34

## 2024-01-19 RX ADMIN — CEFAZOLIN SODIUM 2 G: 1 INJECTION, SOLUTION INTRAVENOUS at 07:50

## 2024-01-19 RX ADMIN — ENOXAPARIN SODIUM 30 MG: 100 INJECTION SUBCUTANEOUS at 21:23

## 2024-01-19 RX ADMIN — Medication 10 ML: at 22:00

## 2024-01-19 ASSESSMENT — PAIN SCALES - GENERAL
PAINLEVEL_OUTOF10: 5
PAINLEVEL_OUTOF10: 6

## 2024-01-19 ASSESSMENT — PAIN DESCRIPTION - DESCRIPTORS: DESCRIPTORS: ACHING;CRAMPING

## 2024-01-19 ASSESSMENT — PAIN DESCRIPTION - LOCATION: LOCATION: ABDOMEN

## 2024-01-19 ASSESSMENT — PAIN - FUNCTIONAL ASSESSMENT: PAIN_FUNCTIONAL_ASSESSMENT: ACTIVITIES ARE NOT PREVENTED

## 2024-01-19 ASSESSMENT — PAIN DESCRIPTION - ORIENTATION: ORIENTATION: ANTERIOR

## 2024-01-19 NOTE — FLOWSHEET NOTE
Note that the pt's blood sugar is 210 and I gave 4 units of Humalog in right arm. The patient's friend brought in [place] Food from a restaurant in town. It included a large amount of rice. Measured out a small amount of rice for patient with a sauce to cover it. Educated on Diabetes. Pt and her significant other verbalized understanding.    Normal for race

## 2024-01-19 NOTE — LACTATION NOTE
Attempted to use language line, unable to get anyone on the line. Father of baby speaks English well. Mother and father state they are formula feeding her infant. Discussed with mother that bottle fed infants should feed every 3-4 hours and to burp infant frequently during feeding. Discussed with mother that once she starts a bottle that it is good for one hour and what is left over after an hour needs to be thrown away. Discussed with mother how to prepare formula, to store formula, to warm formula and safety when feeding infant. Mother verbalizes understanding. Encouraged mother to call for any questions or concerns.   Father of baby able to repeat formula feeding instructions.

## 2024-01-19 NOTE — PROGRESS NOTES
Patient to room MB15 from OR. FOB at bedside with infant in bassinet. Bilateral breath sounds clear on auscultation. Abdomen soft. IV infusing. Jacque-pad with moderate amount of rubra after no bleeding in recovery. Flood patent draining to gravity. Call light within reach.     Couplet handoff given to postpartum nurse Kim.

## 2024-01-19 NOTE — ANESTHESIA POSTPROCEDURE EVALUATION
Department of Anesthesiology  Postprocedure Note    Patient: Liam Mejias  MRN: [id number]  YOB: 1985  Date of evaluation: 2024    Procedure Summary     Date: 24 Room / Location: [facility] OR [address] [place] [address]    Anesthesia Start: 739 Anesthesia Stop: 847    Procedure:  SECTION (Abdomen) Diagnosis:       S/P  section      (S/P  section [Z98.891])    Surgeons: Reed Ybarra MD Responsible Provider: Aydin Morin APRN - CRNA    Anesthesia Type: Spinal ASA Status: 2          Anesthesia Type: Spinal    Babatunde Phase I:      Babatunde Phase II:      Anesthesia Post Evaluation    Patient location during evaluation: PACU  Patient participation: complete - patient participated  Level of consciousness: awake and alert  Pain score: 2  Airway patency: patent  Nausea & Vomiting: no nausea and no vomiting  Cardiovascular status: blood pressure returned to baseline  Respiratory status: acceptable  Hydration status: euvolemic  Multimodal analgesia pain management approach  Pain management: adequate        No notable events documented.

## 2024-01-19 NOTE — H&P
Department of Obstetrics and Gynecology   Obstetrics History and Physical        CHIEF COMPLAINT:  previous , DM    HISTORY OF PRESENT ILLNESS:      The patient is a 38 y.o. female at 37w6d.  OB History          4    Para   1    Term   1       0    AB   2    Living   1         SAB   0    IAB   1    Ectopic   0    Molar   0    Multiple   0    Live Births   1            Patient presents with a chief complaint as above and is being admitted for   without tubal ligation    Estimated Due Date: Estimated Date of Delivery: 2/3/24    PRENATAL CARE:    Complicated by: diabetes mellitus    PAST OB HISTORY  OB History          4    Para   1    Term   1       0    AB   2    Living   1         SAB   0    IAB   1    Ectopic   0    Molar   0    Multiple   0    Live Births   1                Past Medical History:        Diagnosis Date    Gestational diabetes      Past Surgical History:    No past surgical history on file.  Allergies:  Penicillins  Social History:    Social History     Socioeconomic History    Marital status: Single     Spouse name: Not on file    Number of children: Not on file    Years of education: Not on file    Highest education level: Not on file   Occupational History    Not on file   Tobacco Use    Smoking status: Never    Smokeless tobacco: Never   Vaping Use    Vaping Use: Never used   Substance and Sexual Activity    Alcohol use: Never    Drug use: Never    Sexual activity: Not on file   Other Topics Concern    Not on file   Social History Narrative    Not on file     Social Determinants of Health     Financial Resource Strain: Not on file   Food Insecurity: No Food Insecurity (2024)    Hunger Vital Sign     Worried About Running Out of Food in the Last Year: Never true     Ran Out of Food in the Last Year: Never true   Transportation Needs: No Transportation Needs (2024)    PRAPARE - Transportation     Lack of Transportation (Medical): No     Lack of

## 2024-01-19 NOTE — OP NOTE
PATIENT:    Liam Mejias    PREOPERATIVE DIAGNOSES:  1.   37w6d        2.previous cs     POSTOPERATIVE DIAGNOSES:  Same      PROCEDURE:     repeat low transverse  section.      SURGEON:     Reed Ybarra MD    ASSIST:  Theresa FRANCO to assist with safe and efficacious delivery      ESTIMATED BLOOD LOSS:   500 mL.      COMPLICATIONS:     None.         ANESTHESIA:    Spinal.         FINDINGS:   Live female        Normal tubes and ovaries bilaterally.         DETAILS OF PROCEDURE:   With the patient in the sitting position, spinal anesthesia was given without any complications.She was then placed in the supine position slightly tilted to the left. Abdomen was scrubbed and draped in the usual manner.Anesthesia level was checked and was found to be adequate. The abdomen was entered thru a transverse incision The Bovie was used to go through the subcutaneous tissue. The fascia was entered in the same plane as the skin incision and  from the underlying rectus abdominis muscles which were  in the midline exposing the parietal peritoneum. The peritoneum was opened sharply in a longitudinal fashion. A bladder retractor was placed in position and the visceral peritoneum overlying the lower uterine segment was opened transversely and a bladder flap was developed in a sharp manner. The lower uterine segment was opened in a low transverse fashion. The amniotic cavity was entered and Clear amniotic fluid was suctioned out. The baby was then delivered from the Cephalic without any complications.The baby was handed over to the nursery nurse. Cord blood was saved. The placenta was then removed manually and the endometrial cavity was swept clean by a wet lap. The edges of the uterine incision were grasped by Allis clamps and the incision itself was closed using a continuous locked suture of 0 vicryl. Tubes and ovaries were inspected and appeared to be normal. The gutters were cleared of any blood

## 2024-01-19 NOTE — FLOWSHEET NOTE
EFM and toco placed on patient; denies any pain at this time, abdomen soft and non-tender upon palpation; positive fetal movement noted, plan of care discussed with patient.

## 2024-01-19 NOTE — PROGRESS NOTES
I have reviewed the patient's chart and H/P and first assisted on this patient's  delivery.     CUAUHTEMOC Zaman - ZAFARM

## 2024-01-19 NOTE — ANESTHESIA PRE PROCEDURE
99.3 kg (219 lb)   07/07/23 92.1 kg (203 lb)     Body mass index is 40.37 kg/m².    CBC:   Lab Results   Component Value Date/Time    WBC 6.6 01/19/2024 06:40 AM    RBC 3.35 01/19/2024 06:40 AM    HGB 10.4 01/19/2024 06:40 AM    HCT 30.9 01/19/2024 06:40 AM    MCV 92.2 01/19/2024 06:40 AM    RDW 12.3 01/19/2024 06:40 AM     01/19/2024 06:40 AM       CMP:   Lab Results   Component Value Date/Time     12/15/2022 05:09 PM    K 3.6 12/15/2022 05:09 PM     12/15/2022 05:09 PM    CO2 23 12/15/2022 05:09 PM    BUN 6 12/15/2022 05:09 PM    CREATININE 0.5 12/15/2022 05:09 PM    LABGLOM >60 12/15/2022 05:09 PM    GLUCOSE 95 12/15/2022 05:09 PM    PROT 7.6 12/15/2022 05:09 PM    CALCIUM 9.3 12/15/2022 05:09 PM    BILITOT 0.2 12/15/2022 05:09 PM    ALKPHOS 52 12/15/2022 05:09 PM    AST 21 12/15/2022 05:09 PM    ALT 16 12/15/2022 05:09 PM       POC Tests:   Recent Labs     01/19/24  0659   POCGLU 99       Coags: No results found for: \"PROTIME\", \"INR\", \"APTT\"    HCG (If Applicable):   Lab Results   Component Value Date    PREGTESTUR Positive 07/07/2023        ABGs: No results found for: \"PHART\", \"PO2ART\", \"YIY8UMY\", \"PDA2APT\", \"BEART\", \"Y7PTEHRV\"     Type & Screen (If Applicable):  No results found for: \"LABABO\", \"LABRH\"    Drug/Infectious Status (If Applicable):  No results found for: \"HIV\", \"HEPCAB\"    COVID-19 Screening (If Applicable): No results found for: \"COVID19\"        Anesthesia Evaluation  Patient summary reviewed  Airway: Mallampati: III  TM distance: >3 FB   Neck ROM: limited  Mouth opening: > = 3 FB   Dental: normal exam         Pulmonary:Negative Pulmonary ROS and normal exam                               Cardiovascular:Negative CV ROS                      Neuro/Psych:   Negative Neuro/Psych ROS              GI/Hepatic/Renal: Neg GI/Hepatic/Renal ROS            Endo/Other:                     Abdominal: normal exam            Vascular: negative vascular ROS.         Other Findings:

## 2024-01-20 LAB
GLUCOSE BLD-MCNC: 67 MG/DL (ref 70–99)
GLUCOSE BLD-MCNC: 74 MG/DL (ref 70–99)
GLUCOSE BLD-MCNC: 94 MG/DL (ref 70–99)
HCT VFR BLD CALC: 29.7 % (ref 37–47)
HEMOGLOBIN: 9.6 GM/DL (ref 12.5–16)
MCH RBC QN AUTO: 30.3 PG (ref 27–31)
MCHC RBC AUTO-ENTMCNC: 32.3 % (ref 32–36)
MCV RBC AUTO: 93.7 FL (ref 78–100)
PDW BLD-RTO: 12.2 % (ref 11.7–14.9)
PLATELET # BLD: 312 K/CU MM (ref 140–440)
PMV BLD AUTO: 8.5 FL (ref 7.5–11.1)
RBC # BLD: 3.17 M/CU MM (ref 4.2–5.4)
WBC # BLD: 9.4 K/CU MM (ref 4–10.5)

## 2024-01-20 PROCEDURE — 1220000000 HC SEMI PRIVATE OB R&B

## 2024-01-20 PROCEDURE — 82962 GLUCOSE BLOOD TEST: CPT

## 2024-01-20 PROCEDURE — 2580000003 HC RX 258: Performed by: OBSTETRICS & GYNECOLOGY

## 2024-01-20 PROCEDURE — 94761 N-INVAS EAR/PLS OXIMETRY MLT: CPT

## 2024-01-20 PROCEDURE — 83036 HEMOGLOBIN GLYCOSYLATED A1C: CPT

## 2024-01-20 PROCEDURE — 6370000000 HC RX 637 (ALT 250 FOR IP): Performed by: OBSTETRICS & GYNECOLOGY

## 2024-01-20 PROCEDURE — 85027 COMPLETE CBC AUTOMATED: CPT

## 2024-01-20 PROCEDURE — 6360000002 HC RX W HCPCS: Performed by: OBSTETRICS & GYNECOLOGY

## 2024-01-20 RX ORDER — INSULIN LISPRO 100 [IU]/ML
0-4 INJECTION, SOLUTION INTRAVENOUS; SUBCUTANEOUS
Status: DISCONTINUED | OUTPATIENT
Start: 2024-01-20 | End: 2024-01-22 | Stop reason: HOSPADM

## 2024-01-20 RX ADMIN — IBUPROFEN 800 MG: 800 TABLET, FILM COATED ORAL at 09:25

## 2024-01-20 RX ADMIN — IBUPROFEN 800 MG: 800 TABLET, FILM COATED ORAL at 01:18

## 2024-01-20 RX ADMIN — ENOXAPARIN SODIUM 30 MG: 100 INJECTION SUBCUTANEOUS at 09:25

## 2024-01-20 RX ADMIN — ENOXAPARIN SODIUM 30 MG: 100 INJECTION SUBCUTANEOUS at 23:02

## 2024-01-20 RX ADMIN — DOCUSATE SODIUM 100 MG: 100 CAPSULE, LIQUID FILLED ORAL at 23:02

## 2024-01-20 RX ADMIN — DOCUSATE SODIUM 100 MG: 100 CAPSULE, LIQUID FILLED ORAL at 09:24

## 2024-01-20 RX ADMIN — IBUPROFEN 800 MG: 800 TABLET, FILM COATED ORAL at 18:11

## 2024-01-20 RX ADMIN — Medication 10 ML: at 23:01

## 2024-01-20 ASSESSMENT — PAIN SCALES - GENERAL
PAINLEVEL_OUTOF10: 5
PAINLEVEL_OUTOF10: 6

## 2024-01-20 ASSESSMENT — PAIN DESCRIPTION - LOCATION
LOCATION: ABDOMEN
LOCATION: ABDOMEN

## 2024-01-20 ASSESSMENT — PAIN - FUNCTIONAL ASSESSMENT
PAIN_FUNCTIONAL_ASSESSMENT: ACTIVITIES ARE NOT PREVENTED
PAIN_FUNCTIONAL_ASSESSMENT: ACTIVITIES ARE NOT PREVENTED

## 2024-01-20 ASSESSMENT — PAIN DESCRIPTION - DESCRIPTORS
DESCRIPTORS: ACHING;CRAMPING
DESCRIPTORS: ACHING;CRAMPING

## 2024-01-20 ASSESSMENT — PAIN DESCRIPTION - ORIENTATION
ORIENTATION: ANTERIOR
ORIENTATION: ANTERIOR

## 2024-01-20 NOTE — CONSULTS
Session ID: [id number]  Language: Kianna Durand   ID: #[id number]   Name: Destiny
Session ID: [id number]  Language: [place]   ID: #[id number]   Name: Maya
Session ID: [id number]  Language: Kianna Dur[place]   ID: #[id number]   Name: Yahaira
gestation of pregnancy    Previous  delivery affecting pregnancy    Pre-existing diabetes mellitus during pregnancy, antepartum         RECOMMENDATIONS:      Reviewed POC blood glucose . Labs and X ray results   Reviewed Home and Current Medicines   Will continue on Correction bolus Humalog/ Insulin regime  Monitor Blood glucose frequently   Modify  the dose of Insulin  as needed        Will follow with you  Again thank you for sharing pt's care with me.     Truly yours,       EMY Sanchez MD

## 2024-01-20 NOTE — PROGRESS NOTES
Department of Obstetrics and Gynecology  Labor and Delivery   Post Partum Progress Note      SUBJECTIVE:  Doing well with no complaints. Ambulating throughout room without dizziness or other s/s of anemia. Reports bleeding is decreasing and pain is well controlled with medication. Denies pain or discharge at incisional site. Has voided without difficulty. Has not had BM, + flatus. Eating and drinking well. Denies HA/visual changes/epigastric pain. Bottle feeding is going well. Reports good social support. Denies emotional concerns at this time.     OBJECTIVE:      Vitals:  /70   Pulse 85   Temp 98.4 °F (36.9 °C) (Axillary)   Resp 18   Ht 1.59 m (5' 2.6\")   Wt 102.1 kg (225 lb)   LMP 04/29/2023 (Exact Date)   SpO2 98%   Breastfeeding Unknown   BMI 40.37 kg/m²       CONSTITUTIONAL: generally well-appearing.   ABDOMEN:  Soft, well contracted uterus. Fundus firm at u-1. C/s incision c/d/I; occlusive dressing still in place. No erythema or discharge noted. BS present x 4 quadrants.   LOCHIA: Normal per pt  LUNGS: CTAB  HEART: RRR,   EXTREMITIES: No calf tenderness, erythema or swelling bilaterally       ASSESSMENT:      POD # 1  S/p repeat C/s  Bottle feeding  Type II DM; on insulin   AMA  Anemia (hgb 9.6)   RH +    PLAN:     Endocrinology consult for type II DM management.   Will start on ferrous sulfate for anemia.   Encouraged rest/hydration/ambulation/PO intake.       Rayne Solis, CUAUHTEMOC - CITLALY

## 2024-01-21 LAB
GLUCOSE BLD-MCNC: 102 MG/DL (ref 70–99)
GLUCOSE BLD-MCNC: 108 MG/DL (ref 70–99)
GLUCOSE BLD-MCNC: 155 MG/DL (ref 70–99)
GLUCOSE BLD-MCNC: 178 MG/DL (ref 70–99)
GLUCOSE BLD-MCNC: 87 MG/DL (ref 70–99)

## 2024-01-21 PROCEDURE — 1220000000 HC SEMI PRIVATE OB R&B

## 2024-01-21 PROCEDURE — 6360000002 HC RX W HCPCS: Performed by: OBSTETRICS & GYNECOLOGY

## 2024-01-21 PROCEDURE — 6370000000 HC RX 637 (ALT 250 FOR IP): Performed by: OBSTETRICS & GYNECOLOGY

## 2024-01-21 PROCEDURE — 82962 GLUCOSE BLOOD TEST: CPT

## 2024-01-21 RX ADMIN — ENOXAPARIN SODIUM 30 MG: 100 INJECTION SUBCUTANEOUS at 20:33

## 2024-01-21 RX ADMIN — DOCUSATE SODIUM 100 MG: 100 CAPSULE, LIQUID FILLED ORAL at 08:49

## 2024-01-21 RX ADMIN — DOCUSATE SODIUM 100 MG: 100 CAPSULE, LIQUID FILLED ORAL at 20:33

## 2024-01-21 RX ADMIN — IBUPROFEN 800 MG: 800 TABLET, FILM COATED ORAL at 01:37

## 2024-01-21 RX ADMIN — ENOXAPARIN SODIUM 30 MG: 100 INJECTION SUBCUTANEOUS at 08:49

## 2024-01-21 RX ADMIN — IBUPROFEN 800 MG: 800 TABLET, FILM COATED ORAL at 09:47

## 2024-01-21 RX ADMIN — IBUPROFEN 800 MG: 800 TABLET, FILM COATED ORAL at 18:38

## 2024-01-21 RX ADMIN — FERROUS SULFATE TAB 325 MG (65 MG ELEMENTAL FE) 325 MG: 325 (65 FE) TAB at 08:50

## 2024-01-21 ASSESSMENT — PAIN DESCRIPTION - LOCATION
LOCATION: ABDOMEN
LOCATION: ABDOMEN

## 2024-01-21 ASSESSMENT — PAIN DESCRIPTION - DESCRIPTORS
DESCRIPTORS: ACHING;CRAMPING
DESCRIPTORS: DISCOMFORT

## 2024-01-21 ASSESSMENT — PAIN SCALES - GENERAL
PAINLEVEL_OUTOF10: 6
PAINLEVEL_OUTOF10: 5

## 2024-01-21 ASSESSMENT — PAIN DESCRIPTION - ORIENTATION
ORIENTATION: ANTERIOR
ORIENTATION: ANTERIOR

## 2024-01-21 NOTE — DISCHARGE INSTRUCTIONS
Specific instructions from Dr. Sanchez-    - schedule a follow up appointment to be seen in 2 weeks.   - keep a written log of your blood sugars to show at office visit.  - take blood sugar before breakfast and 2 hours after each meal.

## 2024-01-21 NOTE — PROGRESS NOTES
Department of Obstetrics and Gynecology  Labor and Delivery   Post Partum Progress Note      SUBJECTIVE:  Doing well with no complaints. Ambulating throughout room without dizziness or other s/s of anemia. Reports bleeding is decreasing and pain is well controlled with medication. Denies pain or discharge at incisional site. Has voided without difficulty. Has not had BM, + flatus. Eating and drinking well. Denies HA/visual changes/epigastric pain. Bottle feeding is going well. Reports good social support. Denies emotional concerns at this time.     OBJECTIVE:      Vitals:  /65   Pulse 89   Temp 98.4 °F (36.9 °C) (Oral)   Resp 16   Ht 1.59 m (5' 2.6\")   Wt 102.1 kg (225 lb)   LMP 04/29/2023 (Exact Date)   SpO2 97%   Breastfeeding Unknown   BMI 40.37 kg/m²   Lab Results   Component Value Date    WBC 9.4 01/20/2024    HGB 9.6 (L) 01/20/2024    HCT 29.7 (L) 01/20/2024    MCV 93.7 01/20/2024     01/20/2024       CONSTITUTIONAL: generally well-appearing.   ABDOMEN:  Soft, well contracted uterus. Fundus firm at u-1. C/s incision covered with steri-strips  LOCHIA: Normal per pt  LUNGS: Normal effort on room air  HEART: Normal rate per VS   EXTREMITIES: No calf tenderness or erythema reported per pt. Appropriate swelling bilaterally       ASSESSMENT:      POD # 1  S/p repeat C/s  Type 2 DM, on insulin  Anemia  Bottle feeding well      PLAN:     Routine PP C/S orders.  Continue to monitor.        CUAUHTEMOC Anthony CNM

## 2024-01-21 NOTE — PLAN OF CARE
Problem: Vaginal Birth or  Section  Goal: Fetal and maternal status remain reassuring during the birth process  Description:  Birth OB-Pregnancy care plan goal which identifies if the fetal and maternal status remain reassuring during the birth process  Outcome: Progressing     Problem: Postpartum  Goal: Experiences normal postpartum course  Description:  Postpartum OB-Pregnancy care plan goal which identifies if the mother is experiencing a normal postpartum course  Outcome: Progressing  Goal: Appropriate maternal -  bonding  Description:  Postpartum OB-Pregnancy care plan goal which identifies if the mother and  are bonding appropriately  Outcome: Progressing  Goal: Establishment of infant feeding pattern  Description:  Postpartum OB-Pregnancy care plan goal which identifies if the mother is establishing a feeding pattern with their   Outcome: Progressing  Goal: Incisions, wounds, or drain sites healing without S/S of infection  Outcome: Progressing     Problem: Pain  Goal: Verbalizes/displays adequate comfort level or baseline comfort level  Outcome: Progressing  Flowsheets (Taken 2024)  Verbalizes/displays adequate comfort level or baseline comfort level: Encourage patient to monitor pain and request assistance     Problem: Infection - Adult  Goal: Absence of infection at discharge  Outcome: Progressing  Goal: Absence of infection during hospitalization  Outcome: Progressing  Goal: Absence of fever/infection during anticipated neutropenic period  Outcome: Progressing     Problem: Safety - Adult  Goal: Free from fall injury  Outcome: Progressing     Problem: Discharge Planning  Goal: Discharge to home or other facility with appropriate resources  Outcome: Progressing     Problem: Chronic Conditions and Co-morbidities  Goal: Patient's chronic conditions and co-morbidity symptoms are monitored and maintained or improved  Outcome: Progressing

## 2024-01-21 NOTE — PROGRESS NOTES
Progress Note( Dr. Sanchez)  2024  Subjective:   Admit Date: 2024  PCP: No primary care provider on file.    Admitted For :  Delivering the baby had  on 2024     Consulted For: Better control of blood glucose     Interval History: Patient is doing a lot better this morning did not require any insulin yesterday blood glucose has been in the 170+ some time    Patient is anemic the most recent hemoglobin hemoglobin was 9.6    Denies any chest pains,   Denies SOB .   Denies nausea or vomiting.   No new bowel or bladder symptoms.     No intake or output data in the 24 hours ending 24 1439    DATA    CBC:   Recent Labs     24  0640 24  0600   WBC 6.6 9.4   HGB 10.4* 9.6*    312    CMP:No results for input(s): \"NA\", \"K\", \"CL\", \"CO2\", \"BUN\", \"CREATININE\", \"GLU\", \"CALCIUM\", \"PROT\", \"LABALBU\", \"BILITOT\", \"ALKPHOS\", \"AST\", \"ALT\" in the last 72 hours.  Lipids: No results found for: \"CHOL\", \"HDL\", \"TRIG\"  Glucose:  Recent Labs     24  0005 24  0843 24  1324   POCGLU 178* 102* 87     [id number]:No results found for: \"LABA1C\"  High Sensitivity TSH: No results found for: \"TSHHS\"  Free T3: No results found for: \"FT3\"  Free T4:No results found for: \"T4FREE\"    No orders to display        Scheduled Medicines   Medications:    insulin lispro  0-4 Units SubCUTAneous TID WC    sodium chloride flush  5-40 mL IntraVENous 2 times per day    ibuprofen  800 mg Oral 3 times per day    docusate sodium  100 mg Oral BID    ferrous sulfate  325 mg Oral Every Other Day    measles, mumps & rubella vaccine  0.5 mL SubCUTAneous Prior to discharge    Tdap-Dtap  0.5 mL IntraMUSCular Prior to discharge    enoxaparin  30 mg SubCUTAneous BID    insulin lispro  0-4 Units SubCUTAneous Nightly      Infusions:    lactated ringers IV soln 125 mL/hr at 24 1808    sodium chloride      dextrose           Objective:   Vitals: /65   Pulse 89   Temp 98.4 °F (36.9 °C) (Oral)   Resp

## 2024-01-22 VITALS
DIASTOLIC BLOOD PRESSURE: 86 MMHG | OXYGEN SATURATION: 99 % | WEIGHT: 225 LBS | HEIGHT: 63 IN | TEMPERATURE: 98.1 F | SYSTOLIC BLOOD PRESSURE: 128 MMHG | HEART RATE: 86 BPM | RESPIRATION RATE: 18 BRPM | BODY MASS INDEX: 39.87 KG/M2

## 2024-01-22 LAB
GLUCOSE BLD-MCNC: 103 MG/DL (ref 70–99)
GLUCOSE BLD-MCNC: 90 MG/DL (ref 70–99)

## 2024-01-22 PROCEDURE — 6360000002 HC RX W HCPCS: Performed by: OBSTETRICS & GYNECOLOGY

## 2024-01-22 PROCEDURE — 6370000000 HC RX 637 (ALT 250 FOR IP): Performed by: OBSTETRICS & GYNECOLOGY

## 2024-01-22 PROCEDURE — 82962 GLUCOSE BLOOD TEST: CPT

## 2024-01-22 RX ORDER — OXYCODONE HYDROCHLORIDE 5 MG/1
5 TABLET ORAL EVERY 6 HOURS PRN
Qty: 12 TABLET | Refills: 0 | Status: SHIPPED | OUTPATIENT
Start: 2024-01-22 | End: 2024-01-25

## 2024-01-22 RX ORDER — FERROUS SULFATE 325(65) MG
325 TABLET ORAL EVERY OTHER DAY
Qty: 30 TABLET | Refills: 1 | Status: SHIPPED | OUTPATIENT
Start: 2024-01-23

## 2024-01-22 RX ORDER — PSEUDOEPHEDRINE HCL 30 MG
100 TABLET ORAL 2 TIMES DAILY
Qty: 30 CAPSULE | Refills: 1 | Status: SHIPPED | OUTPATIENT
Start: 2024-01-22

## 2024-01-22 RX ORDER — IBUPROFEN 800 MG/1
800 TABLET ORAL EVERY 8 HOURS SCHEDULED
Qty: 40 TABLET | Refills: 1 | Status: SHIPPED | OUTPATIENT
Start: 2024-01-22

## 2024-01-22 RX ADMIN — DOCUSATE SODIUM 100 MG: 100 CAPSULE, LIQUID FILLED ORAL at 08:52

## 2024-01-22 RX ADMIN — IBUPROFEN 800 MG: 800 TABLET, FILM COATED ORAL at 08:52

## 2024-01-22 RX ADMIN — ENOXAPARIN SODIUM 30 MG: 100 INJECTION SUBCUTANEOUS at 08:52

## 2024-01-22 ASSESSMENT — PAIN - FUNCTIONAL ASSESSMENT: PAIN_FUNCTIONAL_ASSESSMENT: ACTIVITIES ARE NOT PREVENTED

## 2024-01-22 ASSESSMENT — PAIN SCALES - GENERAL: PAINLEVEL_OUTOF10: 6

## 2024-01-22 ASSESSMENT — PAIN DESCRIPTION - DESCRIPTORS: DESCRIPTORS: ACHING;CRAMPING

## 2024-01-22 ASSESSMENT — PAIN DESCRIPTION - ORIENTATION: ORIENTATION: ANTERIOR

## 2024-01-22 ASSESSMENT — PAIN DESCRIPTION - LOCATION: LOCATION: ABDOMEN

## 2024-01-22 NOTE — PLAN OF CARE
Problem: Vaginal Birth or  Section  Goal: Fetal and maternal status remain reassuring during the birth process  Description:  Birth OB-Pregnancy care plan goal which identifies if the fetal and maternal status remain reassuring during the birth process  2024 by Kim Edwards RN  Outcome: Completed  2024 by Ignacia Almaraz RN  Outcome: Progressing     Problem: Postpartum  Goal: Experiences normal postpartum course  Description:  Postpartum OB-Pregnancy care plan goal which identifies if the mother is experiencing a normal postpartum course  2024 by Kim Edwards RN  Outcome: Completed  2024 by Ignacia Almaraz RN  Outcome: Progressing  Goal: Appropriate maternal -  bonding  Description:  Postpartum OB-Pregnancy care plan goal which identifies if the mother and  are bonding appropriately  2024 by Kim Edwards RN  Outcome: Completed  2024 by Ignacia Almaraz RN  Outcome: Progressing  Goal: Establishment of infant feeding pattern  Description:  Postpartum OB-Pregnancy care plan goal which identifies if the mother is establishing a feeding pattern with their   2024 by Kim Edwards RN  Outcome: Completed  2024 by Ignacia Almaraz RN  Outcome: Progressing  Goal: Incisions, wounds, or drain sites healing without S/S of infection  2024 by Kim Edwards RN  Outcome: Completed  2024 by Ignacia Almaraz RN  Outcome: Progressing     Problem: Pain  Goal: Verbalizes/displays adequate comfort level or baseline comfort level  2024 by Kim Edwards RN  Outcome: Completed  Flowsheets  Taken 2024 0852 by Kim Edwards RN  Verbalizes/displays adequate comfort level or baseline comfort level: Encourage patient to monitor pain and request assistance  Taken 2024 0401 by Ignacia Almaraz RN  Verbalizes/displays adequate comfort level or baseline comfort level: Encourage patient to monitor pain

## 2024-01-22 NOTE — FLOWSHEET NOTE
[facility] with Soha at [facility] to schedule a follow up appt. The appointment for an incsion check is on February 1st at 9:10am and I will let the patient know.

## 2024-01-22 NOTE — FLOWSHEET NOTE
ID Bands checked. Infants ID band removed and stapled to [place] Identification Footprint Sheet, the mother verified as correct, signed and witnessed by RN. Hugs tag removed. Mother of baby signed Safe Baby Crib Form verifying that she does have a safe crib for baby at home. Baby discharge Instructions given and reviewed. Mother voiced understanding. Father of baby is driving mother and baby home. Mother verbalized understanding to follow up with Pediatric Provider [facility]  in 2-3 days. Baby harnessed into carseat at discharge by parents. Parents and baby escorted to hospital exit by nurse.

## 2024-01-22 NOTE — DISCHARGE SUMMARY
Obstetrical Discharge Form    Gestational Age:  37w6d    Antepartum complications: Type 2 DM, previous C/S, anemia    Date of Delivery:   2024      Type of Delivery:    for repeat    Delivered By:   Dr Ybarra             Baby:       Information for the patient's :  Yehuda Mejias [[id number]]      Anesthesia:    Spinal    Intrapartum complications: Type 2 DM, anemia     Feeding method:   bottle - Similac with iron    Postpartum complications: anemia    Discharge Date:   2024    Condition of discharge:  good    Plan:   Follow up    in 1 week(s)

## 2024-01-22 NOTE — PLAN OF CARE
Problem: Vaginal Birth or  Section  Goal: Fetal and maternal status remain reassuring during the birth process  Description:  Birth OB-Pregnancy care plan goal which identifies if the fetal and maternal status remain reassuring during the birth process  2024 by Ignacia Almaraz RN  Outcome: Progressing  2024 by Kim Edwards RN  Outcome: Progressing     Problem: Postpartum  Goal: Experiences normal postpartum course  Description:  Postpartum OB-Pregnancy care plan goal which identifies if the mother is experiencing a normal postpartum course  2024 by Ignacia Almaraz RN  Outcome: Progressing  2024 by Kim Edwards RN  Outcome: Progressing  Goal: Appropriate maternal -  bonding  Description:  Postpartum OB-Pregnancy care plan goal which identifies if the mother and  are bonding appropriately  2024 by Ignacia Almaraz RN  Outcome: Progressing  2024 by Kim Edwards RN  Outcome: Progressing  Goal: Establishment of infant feeding pattern  Description:  Postpartum OB-Pregnancy care plan goal which identifies if the mother is establishing a feeding pattern with their   2024 by Ignacia Almaraz RN  Outcome: Progressing  2024 by Kim Edwards RN  Outcome: Progressing  Goal: Incisions, wounds, or drain sites healing without S/S of infection  2024 by Ignacia Almaraz RN  Outcome: Progressing  2024 by Kim Edwards RN  Outcome: Progressing     Problem: Pain  Goal: Verbalizes/displays adequate comfort level or baseline comfort level  2024 by Ignacia Almaraz RN  Outcome: Progressing  2024 by Kim Edwards RN  Outcome: Progressing  Flowsheets (Taken 2024 0849)  Verbalizes/displays adequate comfort level or baseline comfort level: Encourage patient to monitor pain and request assistance     Problem: Infection - Adult  Goal: Absence of infection at discharge  2024 by Sung

## 2024-01-22 NOTE — PROGRESS NOTES
Department of Obstetrics and Gynecology  Labor and Delivery   Post Partum Progress Note      SUBJECTIVE:  Doing well with no complaints. Ambulating throughout room without dizziness or other s/s of anemia. Reports bleeding is decreasing and pain is well controlled with medication. Denies pain or discharge at incisional site. Has voided without difficulty. Has not had BM, + flatus. Eating and drinking well. Denies HA/visual changes/epigastric pain. Breastfeeding is going well. Reports good social support. Denies emotional concerns at this time.     OBJECTIVE:      Vitals:  /80   Pulse 79   Temp 98.2 °F (36.8 °C) (Oral)   Resp 18   Ht 1.59 m (5' 2.6\")   Wt 102.1 kg (225 lb)   LMP 04/29/2023 (Exact Date)   SpO2 98%   Breastfeeding Unknown   BMI 40.37 kg/m²   Lab Results   Component Value Date    WBC 9.4 01/20/2024    HGB 9.6 (L) 01/20/2024    HCT 29.7 (L) 01/20/2024    MCV 93.7 01/20/2024     01/20/2024       CONSTITUTIONAL: generally well-appearing.   ABDOMEN:  Soft, well contracted uterus. Fundus firm at u-1. C/s incision covered with post-op dressing, no shadowing observed.   LOCHIA: Normal per pt  LUNGS: Normal effort on room air  HEART: Normal rate per VS   EXTREMITIES: No calf tenderness or erythema reported per pt. Appropriate swelling bilaterally       ASSESSMENT:      POD # 2  S/p repeat C/s  Type 2 DM, on insulin- Hospitalist consulted  Anemia  Bottle feeding well      PLAN:     Routine PP C/S orders.  Continue to monitor.        CUAUHTEMOC Anthony CNM

## 2024-01-22 NOTE — PLAN OF CARE
Problem: Vaginal Birth or  Section  Goal: Fetal and maternal status remain reassuring during the birth process  Description:  Birth OB-Pregnancy care plan goal which identifies if the fetal and maternal status remain reassuring during the birth process  Outcome: Progressing     Problem: Postpartum  Goal: Experiences normal postpartum course  Description:  Postpartum OB-Pregnancy care plan goal which identifies if the mother is experiencing a normal postpartum course  Outcome: Progressing  Goal: Appropriate maternal -  bonding  Description:  Postpartum OB-Pregnancy care plan goal which identifies if the mother and  are bonding appropriately  Outcome: Progressing  Goal: Establishment of infant feeding pattern  Description:  Postpartum OB-Pregnancy care plan goal which identifies if the mother is establishing a feeding pattern with their   Outcome: Progressing  Goal: Incisions, wounds, or drain sites healing without S/S of infection  Outcome: Progressing     Problem: Pain  Goal: Verbalizes/displays adequate comfort level or baseline comfort level  Outcome: Progressing  Flowsheets (Taken 2024 2091)  Verbalizes/displays adequate comfort level or baseline comfort level: Encourage patient to monitor pain and request assistance     Problem: Infection - Adult  Goal: Absence of infection at discharge  Outcome: Progressing  Goal: Absence of infection during hospitalization  Outcome: Progressing  Goal: Absence of fever/infection during anticipated neutropenic period  Outcome: Progressing     Problem: Safety - Adult  Goal: Free from fall injury  Outcome: Progressing     Problem: Discharge Planning  Goal: Discharge to home or other facility with appropriate resources  Outcome: Progressing     Problem: Chronic Conditions and Co-morbidities  Goal: Patient's chronic conditions and co-morbidity symptoms are monitored and maintained or improved  Outcome: Progressing

## 2024-01-24 LAB
ESTIMATED AVERAGE GLUCOSE: 128 MG/DL
HBA1C MFR BLD: 6.1 % (ref 4.2–6.3)

## 2024-03-13 PROBLEM — O26.893 ABDOMINAL PAIN DURING PREGNANCY IN THIRD TRIMESTER: Status: ACTIVE | Noted: 2023-07-07

## 2024-05-08 ENCOUNTER — HOSPITAL ENCOUNTER (EMERGENCY)
Age: 39
Discharge: HOME | End: 2024-05-08
Payer: COMMERCIAL

## 2024-05-08 VITALS
OXYGEN SATURATION: 97 % | RESPIRATION RATE: 16 BRPM | SYSTOLIC BLOOD PRESSURE: 162 MMHG | DIASTOLIC BLOOD PRESSURE: 95 MMHG | HEART RATE: 98 BPM | TEMPERATURE: 99.3 F

## 2024-05-08 DIAGNOSIS — R81 GLUCOSE FOUND IN URINE ON EXAMINATION: ICD-10-CM

## 2024-05-08 DIAGNOSIS — B37.31 YEAST INFECTION OF THE VAGINA: Primary | ICD-10-CM

## 2024-05-08 LAB
BILIRUBIN, URINE: NEGATIVE MG/DL
BLOOD, URINE: NEGATIVE
CLARITY: CLEAR
COLOR: YELLOW
COMMENT UA: ABNORMAL
GLUCOSE URINE: >1000 MG/DL
INTERPRETATION: NORMAL
KETONES, URINE: NEGATIVE MG/DL
LEUKOCYTE ESTERASE, URINE: NEGATIVE
Lab: NORMAL
NITRITE URINE, QUANTITATIVE: NEGATIVE
PH, URINE: 5.5 (ref 5–8)
PREGNANCY, URINE: NEGATIVE
PROTEIN UA: NEGATIVE MG/DL
SPECIFIC GRAVITY UA: 1.02 (ref 1–1.03)
SPECIMEN: NORMAL
UROBILINOGEN, URINE: 0.2 MG/DL (ref 0.2–1)
WET PREP: NORMAL

## 2024-05-08 PROCEDURE — 87210 SMEAR WET MOUNT SALINE/INK: CPT

## 2024-05-08 PROCEDURE — 87591 N.GONORRHOEAE DNA AMP PROB: CPT

## 2024-05-08 PROCEDURE — 99283 EMERGENCY DEPT VISIT LOW MDM: CPT

## 2024-05-08 PROCEDURE — 81003 URINALYSIS AUTO W/O SCOPE: CPT

## 2024-05-08 PROCEDURE — 81025 URINE PREGNANCY TEST: CPT

## 2024-05-08 PROCEDURE — 87491 CHLMYD TRACH DNA AMP PROBE: CPT

## 2024-05-08 RX ORDER — FLUCONAZOLE 150 MG/1
150 TABLET ORAL WEEKLY
Qty: 2 TABLET | Refills: 0 | Status: SHIPPED | OUTPATIENT
Start: 2024-05-08 | End: 2024-05-16

## 2024-05-08 ASSESSMENT — PAIN SCALES - GENERAL: PAINLEVEL_OUTOF10: 7

## 2024-05-08 ASSESSMENT — PAIN DESCRIPTION - LOCATION: LOCATION: VAGINA

## 2024-05-08 ASSESSMENT — PAIN - FUNCTIONAL ASSESSMENT
PAIN_FUNCTIONAL_ASSESSMENT: 0-10
PAIN_FUNCTIONAL_ASSESSMENT: NONE - DENIES PAIN

## 2024-05-08 ASSESSMENT — PAIN DESCRIPTION - ORIENTATION: ORIENTATION: LEFT;RIGHT;ANTERIOR;MID

## 2024-05-08 ASSESSMENT — PAIN DESCRIPTION - DESCRIPTORS: DESCRIPTORS: PATIENT UNABLE TO DESCRIBE

## 2024-05-08 NOTE — DISCHARGE INSTRUCTIONS
Primary Care Physicians    [facility]    Dr. Sean Olivia MD  [facility]  [address]  [place], [place] [address]  [phone]    Zoraida Ayoub CNP  [facility]  [address]  [place], [place] [address]  [phone]    [facility]    Alf Olivia MD  [facility] [address]  [place], [place] [address]  [phone]      [facility].     Maksim Rodríguez MD  [address]  [place], [place] [address]  [phone]    Libra Abarca [facility]  [address] [place] [address]  [phone]    Chayo Faulkner [facility]   [address] [place] [address]  [phone]    Amanda Park MD  [address]  [place], [place] [address]  [phone]     Elpidio Tong MD  [address]  [place], [place] [address]  [phone]    Dianne Tyler PA-C  [address]  [place], [place] [address]  [phone]    Primary Care Providers [place]    Dr. Leroy Chavira MD  [address] [place] [address]  [phone]    Dr. Guy Lee MD   [address] [place] [address]  [phone]    Primary Care Providers Jai Gallego MD  [address] [id number]  [phone]       [facility]    Helen Ahmadi MD  [address] [address]  [phone]    Omi Jalloh CNP  [facility]  [address] [id number]  [phone]       Internal Med    Chayo Kamara NP  [address] [id number]  [phone]        [facility]    Riya Bright MD  [address] [address]  [phone]          Ella Harris, TAY  [facility]  [address]  [place] [id number] [phone]  Same day and quick  care appointments  Mon.-Fri. 8 a.m.-8 p.m.  Sat. 9 a.m.-1 p.m.

## 2024-05-09 NOTE — ED PROVIDER NOTES
[facility] EMERGENCY DEPARTMENT  EMERGENCY DEPARTMENT ENCOUNTER        Pt Name: Liam Mejias  MRN: [id number]  Birthdate 1985  Date of evaluation: 2024  Provider: CUAUHTEMOC Castillo CNP  PCP: No primary care provider on file.  Note Started: 11:46 AM EDT 24      FAUSTINO. I have evaluated this patient.        CHIEF COMPLAINT       Chief Complaint   Patient presents with    Dysuria     X1 week       HISTORY OF PRESENT ILLNESS: 1 or more Elements     History From: Patient    Limitations to history : Language Gabonese Creole    Social Determinants Significantly Affecting Health : None    Chief Complaint: Vaginal itching    Liam Mejias is a 38 y.o. female with a history of gestational diabetes delivery in January who presents to the emergency department stating she was treated for something that was causing vaginal itching during her delivery.  She states she has been fine since but now she is having similar symptoms of vaginal itching with a white discharge.  She states it itches so bad she has tenderness there.  She denies any discharge odor or pain.  Denies any abdominal pain or vaginal pain.  Denies any fevers hematuria or dysuria.  States she does have an appointment follow-up for her OB/GYN on May 30.  States she stopped her birth control pills due to increased bleeding.  States she is unsure if she is exposed to any STDs she only has been with her spouse.  Denies need for treatment is okay with being tested.    Nursing Notes were all reviewed and agreed with or any disagreements were addressed in the HPI.    REVIEW OF SYSTEMS :      Review of Systems    Positives and Pertinent negatives as per HPI.     SURGICAL HISTORY     Past Surgical History:   Procedure Laterality Date     SECTION N/A 2024     SECTION performed by Reed Ybarra MD at [facility]       CURRENTMEDICATIONS       Discharge Medication List as of 2024  5:47 PM

## 2024-05-11 LAB
C TRACH RRNA SPEC QL NAA+PROBE: NEGATIVE
N GONORRHOEA RRNA SPEC QL NAA+PROBE: NEGATIVE

## 2024-05-20 ENCOUNTER — OFFICE VISIT (OUTPATIENT)
Dept: OBGYN | Age: 39
End: 2024-05-20

## 2024-05-20 VITALS
HEIGHT: 62 IN | WEIGHT: 227 LBS | BODY MASS INDEX: 41.77 KG/M2 | SYSTOLIC BLOOD PRESSURE: 141 MMHG | DIASTOLIC BLOOD PRESSURE: 92 MMHG

## 2024-05-20 DIAGNOSIS — Z01.419 ENCOUNTER FOR WELL WOMAN EXAM WITH ROUTINE GYNECOLOGICAL EXAM: Primary | ICD-10-CM

## 2024-05-20 SDOH — ECONOMIC STABILITY: FOOD INSECURITY: WITHIN THE PAST 12 MONTHS, YOU WORRIED THAT YOUR FOOD WOULD RUN OUT BEFORE YOU GOT MONEY TO BUY MORE.: NEVER TRUE

## 2024-05-20 SDOH — ECONOMIC STABILITY: HOUSING INSECURITY
IN THE LAST 12 MONTHS, WAS THERE A TIME WHEN YOU DID NOT HAVE A STEADY PLACE TO SLEEP OR SLEPT IN A SHELTER (INCLUDING NOW)?: NO

## 2024-05-20 SDOH — ECONOMIC STABILITY: INCOME INSECURITY: HOW HARD IS IT FOR YOU TO PAY FOR THE VERY BASICS LIKE FOOD, HOUSING, MEDICAL CARE, AND HEATING?: NOT HARD AT ALL

## 2024-05-20 SDOH — ECONOMIC STABILITY: FOOD INSECURITY: WITHIN THE PAST 12 MONTHS, THE FOOD YOU BOUGHT JUST DIDN'T LAST AND YOU DIDN'T HAVE MONEY TO GET MORE.: NEVER TRUE

## 2024-05-20 ASSESSMENT — PATIENT HEALTH QUESTIONNAIRE - PHQ9
SUM OF ALL RESPONSES TO PHQ QUESTIONS 1-9: 0
SUM OF ALL RESPONSES TO PHQ QUESTIONS 1-9: 0
2. FEELING DOWN, DEPRESSED OR HOPELESS: NOT AT ALL
SUM OF ALL RESPONSES TO PHQ QUESTIONS 1-9: 0
SUM OF ALL RESPONSES TO PHQ9 QUESTIONS 1 & 2: 0
1. LITTLE INTEREST OR PLEASURE IN DOING THINGS: NOT AT ALL
SUM OF ALL RESPONSES TO PHQ QUESTIONS 1-9: 0

## 2024-05-20 NOTE — PROGRESS NOTES
routine gynecological exam          Pap and cultures performed today.  Data Unavailable     No follow-ups on file.    Reed Ybarra MD

## 2024-05-21 ENCOUNTER — HOSPITAL ENCOUNTER (OUTPATIENT)
Age: 39
Setting detail: SPECIMEN
Discharge: HOME | End: 2024-05-21
Payer: COMMERCIAL

## 2024-05-21 PROCEDURE — 87801 DETECT AGNT MULT DNA AMPLI: CPT

## 2024-05-21 PROCEDURE — 87624 HPV HI-RISK TYP POOLED RSLT: CPT

## 2024-05-21 PROCEDURE — 88142 CYTOPATH C/V THIN LAYER: CPT

## 2024-05-25 LAB
C TRACH RRNA SPEC QL NAA+PROBE: NEGATIVE
HPV HIGH RISK: NOT DETECTED
N GONORRHOEA RRNA SPEC QL NAA+PROBE: NEGATIVE

## (undated) DEVICE — GARMENT,MEDLINE,DVT,INT,CALF,MED, GEN2: Brand: MEDLINE

## (undated) DEVICE — APPLICATOR MEDICATED 26 CC SOLUTION HI LT ORNG CHLORAPREP

## (undated) DEVICE — GLOVE ORANGE PI 8   MSG9080

## (undated) DEVICE — STRIP SKIN CLSR W1XL5IN NYLON REINF CURAD STERIL

## (undated) DEVICE — SUTURE ABSRB BRAID COAT UD CTX 3-0 36IN VCRL J980H

## (undated) DEVICE — CLEANER,CAUTERY TIP,2X2",STERILE: Brand: MEDLINE

## (undated) DEVICE — SOLUTION IRRIG 1000ML 0.9% SOD CHL USP POUR PLAS BTL

## (undated) DEVICE — BABY BLANKET KIT: Brand: MEDLINE INDUSTRIES, INC.

## (undated) DEVICE — 3M™ STERI-STRIP™ COMPOUND BENZOIN TINCTURE 40 BAGS/CARTON 4 CARTONS/CASE C1544: Brand: 3M™ STERI-STRIP™

## (undated) DEVICE — PREMIUM WET SKIN PREP TRAY: Brand: MEDLINE INDUSTRIES, INC.

## (undated) DEVICE — TOWEL,OR,DSP,ST,BLUE,STD,6/PK,12PK/CS: Brand: MEDLINE

## (undated) DEVICE — GOWN,SIRUS,FABRNF,RAGLAN,XL,ST,28/CS: Brand: MEDLINE

## (undated) DEVICE — PACK PROCEDURE SURG C SECT

## (undated) DEVICE — ELECTRODE ES AD CRDLSS PT RET REM POLYHESIVE

## (undated) DEVICE — SUTURE VCRL SZ 0 L36IN ABSRB VLT L36MM CT-1 1/2 CIR J346H

## (undated) DEVICE — 4-PORT MANIFOLD: Brand: NEPTUNE 2

## (undated) DEVICE — SPONGE GZ W4XL8IN COT WVN 12 PLY

## (undated) DEVICE — STRIP,CLOSURE,WOUND,MEDI-STRIP,1/2X4: Brand: MEDLINE

## (undated) DEVICE — SOLUTION IRRIG 1000ML STRL H2O USP PLAS POUR BTL

## (undated) DEVICE — SUTURE MCRYL SZ 3-0 L27IN ABSRB UD L60MM KS STR REV CUT Y523H

## (undated) DEVICE — SHEET,DRAPE,53X77,STERILE: Brand: MEDLINE

## (undated) DEVICE — BLOOD TRANSFER DEVICE: Brand: MEDLINE

## (undated) DEVICE — DRESSING TRNSPAR W8XL12IN FLM SURESITE 123

## (undated) DEVICE — TOTAL TRAY, DB, 100% SILI FOLEY, 16FR 10: Brand: MEDLINE

## (undated) DEVICE — GOWN,ECLIPSE,POLYRNF,BRTHSLV,L,30/CS: Brand: MEDLINE